# Patient Record
Sex: FEMALE | Race: ASIAN | Employment: UNEMPLOYED | URBAN - METROPOLITAN AREA
[De-identification: names, ages, dates, MRNs, and addresses within clinical notes are randomized per-mention and may not be internally consistent; named-entity substitution may affect disease eponyms.]

---

## 2021-02-22 ENCOUNTER — OFFICE VISIT (OUTPATIENT)
Dept: RHEUMATOLOGY | Age: 31
End: 2021-02-22

## 2021-02-22 VITALS
DIASTOLIC BLOOD PRESSURE: 77 MMHG | HEART RATE: 89 BPM | WEIGHT: 150.4 LBS | SYSTOLIC BLOOD PRESSURE: 110 MMHG | OXYGEN SATURATION: 98 % | HEIGHT: 62 IN | TEMPERATURE: 97.6 F | RESPIRATION RATE: 18 BRPM | BODY MASS INDEX: 27.68 KG/M2

## 2021-02-22 DIAGNOSIS — Z79.899 ONGOING USE OF POSSIBLY TOXIC MEDICATION: ICD-10-CM

## 2021-02-22 DIAGNOSIS — M05.9 SEROPOSITIVE RHEUMATOID ARTHRITIS (HCC): Primary | ICD-10-CM

## 2021-02-22 PROCEDURE — 99205 OFFICE O/P NEW HI 60 MIN: CPT | Performed by: INTERNAL MEDICINE

## 2021-02-22 RX ORDER — MELOXICAM 15 MG/1
TABLET ORAL
COMMUNITY
Start: 2021-01-05 | End: 2021-07-19 | Stop reason: SDUPTHER

## 2021-02-22 RX ORDER — AZATHIOPRINE 100 MG/1
100 TABLET ORAL DAILY
Qty: 30 TAB | Refills: 3 | Status: SHIPPED | OUTPATIENT
Start: 2021-02-22 | End: 2021-02-28

## 2021-02-22 NOTE — PROGRESS NOTES
REASON FOR VISIT:   Carmen Douglas is a 26 y.o. female without significant medical history who is self-referred to [address] [place] [address], regarding a diagnosis of joint pain. HISTORY OF PRESENT ILLNESS  Disease history obtained from the patient and her , who also provided translation after declining the offered phone . Since the birth of her son in December 2018 she has been having hand pain and swelling. Pain also in wrists, around deltoid insertions on arm, and to lesser extent heels in the morning. Established with Rheumatologist in [place], prescribed meloxicam which hasn't been very helpful. Had heartburn with another NSAID before that. Placing hands in hot water helps. Mostly at night and with waking in the morning. No other breathing complaints, rashes. Weight gain over the last 7-8 months, around 10 pounds despite trying an improved diet. Gets a painful dysesthesias around the right ear/parotid area, lasts minutes at a time then regresses. No obvious swelling. Denies subjective dry eyes or mouth. Sleep has been OK. Denies constipation, diarrhea, or postprandial bloating. Had GERD treated with ranitidine in pregnancy. REVIEW OF SYSTEMS  A comprehensive review of systems was negative except for that written in the HPI. A 10-point review of systems is per the new patient questionnaire, which has been reviewed extensively and scanned into the electronic medical record for future reference. Review of systems is as above and is otherwise negative. ALLERGIES  Patient has no known allergies. MEDICATIONS  Current Outpatient Medications   Medication Sig    meloxicam (MOBIC) 15 mg tablet TAKE 1 TABLET BY MOUTH EVERY DAY WITH BREAKFAST     No current facility-administered medications for this visit. PAST MEDICAL HISTORY  History reviewed. No pertinent past medical history. FAMILY HISTORY  family history is not on file. Father had gout.  Mother has asthma. SOCIAL HISTORY  She  reports that she has never smoked. She has never used smokeless tobacco. She reports that she does not drink alcohol or use drugs. Social History     Social History Narrative    Not on file       DATA  Visit Vitals  /77 (BP 1 Location: Left upper arm, BP Patient Position: Sitting)   Pulse 89   Temp 97.6 °F (36.4 °C) (Oral)   Resp 18   Ht 5' 2\" (1.575 m)   Wt 150 lb 6.4 oz (68.2 kg)   LMP 02/20/2021   SpO2 98%   BMI 27.51 kg/m²    Body mass index is 27.51 kg/m². No flowsheet data found. General:  The patient is well developed, well nourished, alert, and in no apparent distress. Eyes: Sclera are anicteric. No conjunctival injection. HEENT:  Oropharynx is clear. No oral ulcers. Adequate salivary pooling. No cervical or supraclavicular lymphadenopathy. Lungs:  Clear to auscultation bilaterally, without wheeze or stridor. Normal respiratory effort. Cor:  Regular rate and rhythm. No murmur rub or gallop. Abdomen: Soft, non-tender, without hepatomegaly or masses. Extremities: No calf tenderness or edema. Warm and well perfused. Skin:  No significant abnormalities. No petechial, purpuric, or psoriaform rashes   Neuro: Nonfocal, no foot or wrist drop. Negative seated SLR bilaterally. Musculoskeletal:    A comprehensive musculoskeletal exam was performed for all joints of each upper and lower extremity and assessed for swelling, tenderness and range of motion. Results are documented as below:  Synovitis of wrists, pan-MCP tenderness, and PIP synovitis and tenderness  No evidence of synovitis in the small joints of the hands, wrists, shoulders, elbows, hips, knees or ankles.      Labs:  7/28/20: Cr 0.76, CBC WNL  3/11/20 CCP 35 (weak+), RF latex 13.7 [<14]; MARIUM direct negative; CRP <1mg/L, ESR 34;       Imaging:  None available      ASSESSMENT AND PLAN  Ms. Mika Correia is a 26 y.o. 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 Supply shortages nationally make sulfasalazine infeasible, and with active polyarthritis Plaquenil (hydroxychloroquine) would be both slow and most likely insufficient. Applying for azathioprine, to which the patient was agreeable after discussion of risks of cytopenias, infections, liver or kidney abnormalities, nausea/vomiting/diarrhea, or rare associations with lymphoma. 1. Seropositive rheumatoid arthritis (HCC)  - C REACTIVE PROTEIN, QT; Standing  - CBC WITH AUTOMATED DIFF; Standing  - METABOLIC PANEL, COMPREHENSIVE; Standing  - SED RATE (ESR); Standing  - THIOPURINE METHYLTRANSFERASE; Future  - CHRONIC HEPATITIS PANEL; Future  - QUANTIFERON-TB GOLD PLUS; Future  - XR HAND RT MIN 3 V; Future  - XR HAND LT MIN 3 V; Future  - XR FOOT RT MIN 3 V; Future  - XR FOOT LT MIN 3 V; Future  - ANTI-NUCLEAR AB BY IFA (RDL); Future  - azaTHIOprine 100 mg tab; Take 100 mg by mouth daily. Dispense: 30 Tab; Refill: 3  - CBC WITH AUTOMATED DIFF; Standing  - METABOLIC PANEL, COMPREHENSIVE; Standing    2. Ongoing use of possibly toxic medication  - C REACTIVE PROTEIN, QT; Standing  - CBC WITH AUTOMATED DIFF; Standing  - METABOLIC PANEL, COMPREHENSIVE; Standing  - SED RATE (ESR); Standing  - THIOPURINE METHYLTRANSFERASE; Future  - CHRONIC HEPATITIS PANEL; Future  - QUANTIFERON-TB GOLD PLUS; Future  - XR HAND RT MIN 3 V; Future  - XR HAND LT MIN 3 V; Future  - XR FOOT RT MIN 3 V; Future  - XR FOOT LT MIN 3 V; Future  - azaTHIOprine 100 mg tab; Take 100 mg by mouth daily. Dispense: 30 Tab; Refill: 3  - CBC WITH AUTOMATED DIFF; Standing  - METABOLIC PANEL, COMPREHENSIVE; Standing    Patient Instructions   1. Labs ASAP, any [facility]    2. Xrays, if you can't get them today then go to any [address] [place] [address]    3. Start azathioprine 100mg daily. Ok to take meloxicam as needed in the meantime. 4. If you get acid reflux, try famotidine 20mg up to twice a day as needed.     5. Repeat labs again in 1 month    6. Return in 6 weeks. Orders Placed This Encounter    QUANTIFERON-TB GOLD PLUS    XR HAND RT MIN 3 V    XR HAND LT MIN 3 V    XR FOOT RT MIN 3 V    XR FOOT LT MIN 3 V    C REACTIVE PROTEIN, QT    CBC WITH AUTOMATED DIFF    METABOLIC PANEL, COMPREHENSIVE    SED RATE (ESR)    THIOPURINE METHYLTRANSFERASE    CHRONIC HEPATITIS PANEL    ANTI-NUCLEAR AB BY IFA (RDL)    CBC WITH AUTOMATED DIFF    METABOLIC PANEL, COMPREHENSIVE    meloxicam (MOBIC) 15 mg tablet    azaTHIOprine 100 mg tab       Medications: I am having Anastasiya Zhang maintain her meloxicam, start azathioprine. Follow up: Return in about 6 weeks (around 4/5/2021).     Face to face time: 45 minutes  Note preparation and records review day of service: 18 minutes  Total provider time day of service: 63 minutes    Robyn Waite MD    Adult Rheumatology   [address], [address] [place] [address]   Phone [phone]  Fax [phone]

## 2021-02-22 NOTE — PATIENT INSTRUCTIONS
1. Labs ASAP, any [facility] 
 
2. Xrays, if you can't get them today then go to any [address] [place] [address] 3. Start azathioprine 100mg daily. Ok to take meloxicam as needed in the meantime. 4. If you get acid reflux, try famotidine 20mg up to twice a day as needed. 5. Repeat labs again in 1 month 6. Return in 6 weeks.

## 2021-02-27 ENCOUNTER — PATIENT MESSAGE (OUTPATIENT)
Dept: RHEUMATOLOGY | Age: 31
End: 2021-02-27

## 2021-02-27 DIAGNOSIS — Z79.899 ONGOING USE OF POSSIBLY TOXIC MEDICATION: ICD-10-CM

## 2021-02-27 DIAGNOSIS — M05.9 SEROPOSITIVE RHEUMATOID ARTHRITIS (HCC): ICD-10-CM

## 2021-02-28 RX ORDER — AZATHIOPRINE 50 MG/1
100 TABLET ORAL DAILY
Qty: 60 TAB | Refills: 5 | Status: SHIPPED | OUTPATIENT
Start: 2021-02-28 | End: 2021-04-05 | Stop reason: SDUPTHER

## 2021-03-01 LAB
ALBUMIN SERPL-MCNC: 4.6 G/DL (ref 3.8–4.8)
ALBUMIN/GLOB SERPL: 1.4 {RATIO} (ref 1.2–2.2)
ALP SERPL-CCNC: 86 IU/L (ref 39–117)
ALT SERPL-CCNC: 27 IU/L (ref 0–32)
ANA HOMOGEN TITR SER: ABNORMAL {TITER}
ANA SER QL IF: POSITIVE
ANA SPECKLED TITR SER: ABNORMAL {TITER}
AST SERPL-CCNC: 24 IU/L (ref 0–40)
BASOPHILS # BLD AUTO: 0 X10E3/UL (ref 0–0.2)
BASOPHILS NFR BLD AUTO: 0 %
BILIRUB SERPL-MCNC: 0.2 MG/DL (ref 0–1.2)
BUN SERPL-MCNC: 10 MG/DL (ref 6–20)
BUN/CREAT SERPL: 16 (ref 9–23)
CALCIUM SERPL-MCNC: 9.8 MG/DL (ref 8.7–10.2)
CHLORIDE SERPL-SCNC: 104 MMOL/L (ref 96–106)
CO2 SERPL-SCNC: 23 MMOL/L (ref 20–29)
COMMENT, 144067: NORMAL
CREAT SERPL-MCNC: 0.62 MG/DL (ref 0.57–1)
CRP SERPL-MCNC: 6 MG/L (ref 0–10)
EOSINOPHIL # BLD AUTO: 0.1 X10E3/UL (ref 0–0.4)
EOSINOPHIL NFR BLD AUTO: 1 %
ERYTHROCYTE [DISTWIDTH] IN BLOOD BY AUTOMATED COUNT: 12.6 % (ref 11.7–15.4)
ERYTHROCYTE [SEDIMENTATION RATE] IN BLOOD BY WESTERGREN METHOD: 62 MM/HR (ref 0–32)
GAMMA INTERFERON BACKGROUND BLD IA-ACNC: 0.07 IU/ML
GLOBULIN SER CALC-MCNC: 3.4 G/DL (ref 1.5–4.5)
GLUCOSE SERPL-MCNC: 90 MG/DL (ref 65–99)
HBV CORE AB SERPL QL IA: NEGATIVE
HBV CORE IGM SERPL QL IA: NEGATIVE
HBV E AB SERPL QL IA: NORMAL
HBV E AG SERPL QL IA: NEGATIVE
HBV SURFACE AB SER QL: NON REACTIVE
HBV SURFACE AG SERPL QL IA: NEGATIVE
HCT VFR BLD AUTO: 39.8 % (ref 34–46.6)
HCV AB S/CO SERPL IA: <0.1 S/CO RATIO (ref 0–0.9)
HGB BLD-MCNC: 12.8 G/DL (ref 11.1–15.9)
IMM GRANULOCYTES # BLD AUTO: 0 X10E3/UL (ref 0–0.1)
IMM GRANULOCYTES NFR BLD AUTO: 0 %
INTERPRETATION:, 510752: NORMAL
LYMPHOCYTES # BLD AUTO: 1.9 X10E3/UL (ref 0.7–3.1)
LYMPHOCYTES NFR BLD AUTO: 19 %
M TB IFN-G BLD-IMP: NEGATIVE
M TB IFN-G CD4+ BCKGRND COR BLD-ACNC: 0.05 IU/ML
MCH RBC QN AUTO: 27.9 PG (ref 26.6–33)
MCHC RBC AUTO-ENTMCNC: 32.2 G/DL (ref 31.5–35.7)
MCV RBC AUTO: 87 FL (ref 79–97)
MITOGEN IGNF BLD-ACNC: >10 IU/ML
MONOCYTES # BLD AUTO: 0.4 X10E3/UL (ref 0.1–0.9)
MONOCYTES NFR BLD AUTO: 4 %
NEUTROPHILS # BLD AUTO: 7.5 X10E3/UL (ref 1.4–7)
NEUTROPHILS NFR BLD AUTO: 76 %
NOTE, 018286: ABNORMAL
PLATELET # BLD AUTO: 172 X10E3/UL (ref 150–450)
POTASSIUM SERPL-SCNC: 4.4 MMOL/L (ref 3.5–5.2)
PROT SERPL-MCNC: 8 G/DL (ref 6–8.5)
QUANTIFERON INCUBATION, QF1T: NORMAL
QUANTIFERON TB2 AG: 0.06 IU/ML
RBC # BLD AUTO: 4.59 X10E6/UL (ref 3.77–5.28)
REF LAB TEST METHOD: NORMAL
SERVICE CMNT-IMP: NORMAL
SODIUM SERPL-SCNC: 143 MMOL/L (ref 134–144)
TPMT RBC-CCNC: 20.1 UNITS/ML RBC
WBC # BLD AUTO: 10.1 X10E3/UL (ref 3.4–10.8)

## 2021-03-09 NOTE — TELEPHONE ENCOUNTER
Young Reilly RN 3/8/2021 8:56 AM EST      ----- Message -----  From: Melvin Garcia  Sent: 3/6/2021 11:54 AM EST  To: [facility] Nurse Pool  Subject: RE: Prescription Question     Dear Dr. Jihan Willis,    I was just curious about the medication, as you requested to contact if we do not hear from you by the end of this week. Could you please help me on this and expedite the process if possible. Thanks again.

## 2021-04-03 LAB
ALBUMIN SERPL-MCNC: 4.9 G/DL (ref 3.8–4.8)
ALBUMIN/GLOB SERPL: 1.8 {RATIO} (ref 1.2–2.2)
ALP SERPL-CCNC: 79 IU/L (ref 39–117)
ALT SERPL-CCNC: 14 IU/L (ref 0–32)
AST SERPL-CCNC: 21 IU/L (ref 0–40)
BASOPHILS # BLD AUTO: 0 X10E3/UL (ref 0–0.2)
BASOPHILS NFR BLD AUTO: 0 %
BILIRUB SERPL-MCNC: 0.4 MG/DL (ref 0–1.2)
BUN SERPL-MCNC: 10 MG/DL (ref 6–20)
BUN/CREAT SERPL: 15 (ref 9–23)
CALCIUM SERPL-MCNC: 9.9 MG/DL (ref 8.7–10.2)
CHLORIDE SERPL-SCNC: 101 MMOL/L (ref 96–106)
CO2 SERPL-SCNC: 26 MMOL/L (ref 20–29)
CREAT SERPL-MCNC: 0.68 MG/DL (ref 0.57–1)
EOSINOPHIL # BLD AUTO: 0.2 X10E3/UL (ref 0–0.4)
EOSINOPHIL NFR BLD AUTO: 2 %
ERYTHROCYTE [DISTWIDTH] IN BLOOD BY AUTOMATED COUNT: 13.2 % (ref 11.7–15.4)
GLOBULIN SER CALC-MCNC: 2.7 G/DL (ref 1.5–4.5)
GLUCOSE SERPL-MCNC: 117 MG/DL (ref 65–99)
HCT VFR BLD AUTO: 37.4 % (ref 34–46.6)
HGB BLD-MCNC: 11.7 G/DL (ref 11.1–15.9)
IMM GRANULOCYTES # BLD AUTO: 0 X10E3/UL (ref 0–0.1)
IMM GRANULOCYTES NFR BLD AUTO: 0 %
LYMPHOCYTES # BLD AUTO: 2.3 X10E3/UL (ref 0.7–3.1)
LYMPHOCYTES NFR BLD AUTO: 25 %
MCH RBC QN AUTO: 27.9 PG (ref 26.6–33)
MCHC RBC AUTO-ENTMCNC: 31.3 G/DL (ref 31.5–35.7)
MCV RBC AUTO: 89 FL (ref 79–97)
MONOCYTES # BLD AUTO: 0.4 X10E3/UL (ref 0.1–0.9)
MONOCYTES NFR BLD AUTO: 5 %
NEUTROPHILS # BLD AUTO: 6.1 X10E3/UL (ref 1.4–7)
NEUTROPHILS NFR BLD AUTO: 68 %
PLATELET # BLD AUTO: 166 X10E3/UL (ref 150–450)
POTASSIUM SERPL-SCNC: 4 MMOL/L (ref 3.5–5.2)
PROT SERPL-MCNC: 7.6 G/DL (ref 6–8.5)
RBC # BLD AUTO: 4.2 X10E6/UL (ref 3.77–5.28)
SODIUM SERPL-SCNC: 141 MMOL/L (ref 134–144)
WBC # BLD AUTO: 9.1 X10E3/UL (ref 3.4–10.8)

## 2021-04-05 ENCOUNTER — OFFICE VISIT (OUTPATIENT)
Dept: RHEUMATOLOGY | Age: 31
End: 2021-04-05
Payer: COMMERCIAL

## 2021-04-05 VITALS
OXYGEN SATURATION: 97 % | DIASTOLIC BLOOD PRESSURE: 76 MMHG | WEIGHT: 150.4 LBS | RESPIRATION RATE: 16 BRPM | BODY MASS INDEX: 27.68 KG/M2 | HEART RATE: 95 BPM | SYSTOLIC BLOOD PRESSURE: 108 MMHG | HEIGHT: 62 IN | TEMPERATURE: 97.7 F

## 2021-04-05 DIAGNOSIS — M05.9 SEROPOSITIVE RHEUMATOID ARTHRITIS (HCC): ICD-10-CM

## 2021-04-05 DIAGNOSIS — Z79.899 ONGOING USE OF POSSIBLY TOXIC MEDICATION: ICD-10-CM

## 2021-04-05 DIAGNOSIS — R76.8 ANA POSITIVE: Primary | ICD-10-CM

## 2021-04-05 PROCEDURE — 99215 OFFICE O/P EST HI 40 MIN: CPT | Performed by: INTERNAL MEDICINE

## 2021-04-05 RX ORDER — PREDNISONE 5 MG/1
TABLET ORAL
Qty: 60 TAB | Refills: 2 | Status: SHIPPED | OUTPATIENT
Start: 2021-04-05 | End: 2021-07-06

## 2021-04-05 RX ORDER — AZATHIOPRINE 50 MG/1
125 TABLET ORAL DAILY
Qty: 75 TAB | Refills: 3 | Status: SHIPPED | OUTPATIENT
Start: 2021-04-05 | End: 2021-08-10

## 2021-04-05 NOTE — PATIENT INSTRUCTIONS
1. Start prednisone 2 pills (10mg) daily with breakfast for 2-4 weeks, then when feeling good decrease to 1 tab (5mg daily) 2. Increase azathioprine to 2.5 tabs (125mg) oral daily with breakfast. 
 
3. Labs again in 2-4 weeks. 4. If the burning pains worsen, let me know and we can try gabapentin for comfort. 5. Look for Voltaren gel (diclofenac 1%) from [facility] or your local pharmacy to use on the knuckles as needed. 6. Return in 6 weeks.

## 2021-04-05 NOTE — PROGRESS NOTES
Chief Complaint   Patient presents with    Arthritis     hands and upper arm     Visit Vitals  /76 (BP 1 Location: Left arm, BP Patient Position: Sitting, BP Cuff Size: Small adult)   Pulse 95   Temp 97.7 °F (36.5 °C) (Oral)   Resp 16   Ht 5' 2\" (1.575 m)   Wt 150 lb 6.4 oz (68.2 kg)   SpO2 97%   BMI 27.51 kg/m²     1. Have you been to the ER, urgent care clinic since your last visit? Hospitalized since your last visit?no    2. Have you seen or consulted any other health care providers outside of the [address] [place] [address] since your last visit? Include any pap smears or colon screening.  no

## 2021-04-05 NOTE — PROGRESS NOTES
REASON FOR VISIT:   Raquel Coley is a 26 y.o. female with history of CCP+, MARIUM+ nonerosive rheumatoid arthritis     HISTORY OF PRESENT ILLNESS  History obtained from patient and  who provided translation after declining . Pain in bilateral 1st MCPs now, doesn't notice major improvement. Tolerating the azathioprine 100mg daily, was slow to start due to high copay initially when we tried to order the generic. No GI upset. Had stopped the meloxicam when she started the azathioprine. Dysesthesias behind ears, not persistent      REVIEW OF SYSTEMS  A comprehensive review of systems was negative except for that written in the HPI. A 10-point review of systems is per the new patient questionnaire, which has been reviewed extensively and scanned into the electronic medical record for future reference. Review of systems is as above and is otherwise negative. ALLERGIES  Patient has no known allergies. MEDICATIONS  Current Outpatient Medications   Medication Sig    azaTHIOprine (IMURAN) 50 mg tablet Take 2 Tabs by mouth daily.  meloxicam (MOBIC) 15 mg tablet TAKE 1 TABLET BY MOUTH EVERY DAY WITH BREAKFAST     No current facility-administered medications for this visit. PAST MEDICAL HISTORY  History reviewed. No pertinent past medical history. FAMILY HISTORY  family history is not on file. Father had gout. Mother has asthma. SOCIAL HISTORY  She  reports that she has never smoked. She has never used smokeless tobacco. She reports that she does not drink alcohol or use drugs. Social History     Social History Narrative    Not on file       DATA  Visit Vitals  /76 (BP 1 Location: Left arm, BP Patient Position: Sitting, BP Cuff Size: Small adult)   Pulse 95   Temp 97.7 °F (36.5 °C) (Oral)   Resp 16   Ht 5' 2\" (1.575 m)   Wt 150 lb 6.4 oz (68.2 kg)   SpO2 97%   BMI 27.51 kg/m²    Body mass index is 27.51 kg/m². No flowsheet data found.     General:  The patient is well developed, well nourished, alert, and in no apparent distress. Eyes: Sclera are anicteric. No conjunctival injection. HEENT:  Oropharynx is clear. No oral ulcers. Adequate salivary pooling. No cervical or supraclavicular lymphadenopathy. Lungs:  Clear to auscultation bilaterally, without wheeze or stridor. Normal respiratory effort. Cor:  Regular rate and rhythm. No murmur rub or gallop. Abdomen: Soft, non-tender, without hepatomegaly or masses. Extremities: No calf tenderness or edema. Warm and well perfused. Skin:  No significant abnormalities. No petechial, purpuric, or psoriaform rashes   Neuro: Nonfocal, no foot or wrist drop. Negative seated SLR bilaterally. Musculoskeletal:    A comprehensive musculoskeletal exam was performed for all joints of each upper and lower extremity and assessed for swelling, tenderness and range of motion. Results are documented as below:  Bilateral 1st MCP synovitis, PIP3 synovitis in right hand  Interval resolved synovitis of wrists. Right 5th MTP tendnerness  No evidence of synovitis in the shoulders, elbows, hips, knees or ankles. Labs:  21: CBC WNL; CMP WNL  21: TPMT WNL; MARIUM 1:320 homogenous; CRP 8mg/L; Hep B cAb neg, sAb neg; sAg neg; Hep C Ab neg  20: Cr 0.76, CBC WNL  3/11/20 CCP 35 (weak+), RF latex 13.7 [<14]; MARIUM direct negative; CRP <1mg/L, ESR 34;       Imagin21: XR hands and feet: No erosions, no chondrocalcinosis, preserved joint spacess      ASSESSMENT AND PLAN  Ms. Dean Sanchez is a 26 y.o. female who presents for evaluation of CCP+, MARIUM+ nonerosive rheumatoid arthritis with early partial response to azathioprine. Bridging with prednisone, increasing to 125mg (~2mg/kg). 1. Seropositive rheumatoid arthritis (HCC)  - predniSONE (DELTASONE) 5 mg tablet; Take 2 tabs (10mg) PO daily with breakfast for 2 weeks, then if feeling well decrease to 1 tab daily until next visit. Dispense: 60 Tab;  Refill: 2  - azaTHIOprine (IMURAN) 50 mg tablet; Take 2.5 Tabs by mouth daily. Dispense: 75 Tab; Refill: 3  - C REACTIVE PROTEIN, QT; Future  - CBC WITH AUTOMATED DIFF; Future  - METABOLIC PANEL, COMPREHENSIVE; Future  - SED RATE (ESR); Future  - COMPLEMENT, C3 & C4; Future  - DSDNA ANTIBODY BY IFA, RJ LUCILIAE, WITH REFLEX TO TITER; Future  - SMITH ANTIBODIES; Future    2. Ongoing use of possibly toxic medication  - azaTHIOprine (IMURAN) 50 mg tablet; Take 2.5 Tabs by mouth daily. Dispense: 75 Tab; Refill: 3  - CBC WITH AUTOMATED DIFF; Future  - METABOLIC PANEL, COMPREHENSIVE; Future    3. MARIUM positive  - predniSONE (DELTASONE) 5 mg tablet; Take 2 tabs (10mg) PO daily with breakfast for 2 weeks, then if feeling well decrease to 1 tab daily until next visit. Dispense: 60 Tab; Refill: 2  - azaTHIOprine (IMURAN) 50 mg tablet; Take 2.5 Tabs by mouth daily. Dispense: 75 Tab; Refill: 3  - C REACTIVE PROTEIN, QT; Future  - CBC WITH AUTOMATED DIFF; Future  - METABOLIC PANEL, COMPREHENSIVE; Future  - SED RATE (ESR); Future  - COMPLEMENT, C3 & C4; Future  - DSDNA ANTIBODY BY IFA, RJ LUCILIAE, WITH REFLEX TO TITER; Future  - SMITH ANTIBODIES; Future      Patient Instructions   1. Start prednisone 2 pills (10mg) daily with breakfast for 2-4 weeks, then when feeling good decrease to 1 tab (5mg daily)    2. Increase azathioprine to 2.5 tabs (125mg) oral daily with breakfast.    3. Labs again in 2-4 weeks. 4. If the burning pains worsen, let me know and we can try gabapentin for comfort. 5. Look for Voltaren gel (diclofenac 1%) from [facility] or your local pharmacy to use on the knuckles as needed. 6. Return in 6 weeks.       Orders Placed This Encounter    C REACTIVE PROTEIN, QT    CBC WITH AUTOMATED DIFF    METABOLIC PANEL, COMPREHENSIVE    SED RATE (ESR)    COMPLEMENT, C3 & C4    DSDNA ANTIBODY BY IFA, RENEEDILENIN LUCILIAE, WITH REFLEX TO TITER    SMITH ANTIBODIES    predniSONE (DELTASONE) 5 mg tablet    azaTHIOprine (IMURAN) 50 mg tablet       Follow up: Return in about 6 weeks (around 5/17/2021).     Face to face time: 30 minutes  Note preparation and records review day of service: 15 minutes  Total provider time day of service: 45 minutes    Marcelino Mejía MD    Adult Rheumatology   [address]  [facility], [facility]   Phone [phone]  Fax [phone]

## 2021-05-05 LAB
ALBUMIN SERPL-MCNC: 4.5 G/DL (ref 3.8–4.8)
ALBUMIN/GLOB SERPL: 1.8 {RATIO} (ref 1.2–2.2)
ALP SERPL-CCNC: 81 IU/L (ref 39–117)
ALT SERPL-CCNC: 28 IU/L (ref 0–32)
AST SERPL-CCNC: 24 IU/L (ref 0–40)
BASOPHILS # BLD AUTO: 0 X10E3/UL (ref 0–0.2)
BASOPHILS NFR BLD AUTO: 0 %
BILIRUB SERPL-MCNC: 0.3 MG/DL (ref 0–1.2)
BUN SERPL-MCNC: 10 MG/DL (ref 6–20)
BUN/CREAT SERPL: 17 (ref 9–23)
C3 SERPL-MCNC: 126 MG/DL (ref 82–167)
C4 SERPL-MCNC: 37 MG/DL (ref 12–38)
CALCIUM SERPL-MCNC: 9 MG/DL (ref 8.7–10.2)
CHLORIDE SERPL-SCNC: 104 MMOL/L (ref 96–106)
CO2 SERPL-SCNC: 28 MMOL/L (ref 20–29)
CREAT SERPL-MCNC: 0.6 MG/DL (ref 0.57–1)
CRP SERPL-MCNC: 2 MG/L (ref 0–10)
DSDNA AB SER QL CLIF: NEGATIVE
ENA SM AB SER-ACNC: <0.2 AI (ref 0–0.9)
EOSINOPHIL # BLD AUTO: 0.1 X10E3/UL (ref 0–0.4)
EOSINOPHIL NFR BLD AUTO: 1 %
ERYTHROCYTE [DISTWIDTH] IN BLOOD BY AUTOMATED COUNT: 14.6 % (ref 11.7–15.4)
ERYTHROCYTE [SEDIMENTATION RATE] IN BLOOD BY WESTERGREN METHOD: 29 MM/HR (ref 0–32)
GLOBULIN SER CALC-MCNC: 2.5 G/DL (ref 1.5–4.5)
GLUCOSE SERPL-MCNC: 105 MG/DL (ref 65–99)
HCT VFR BLD AUTO: 37.9 % (ref 34–46.6)
HGB BLD-MCNC: 11.9 G/DL (ref 11.1–15.9)
IMM GRANULOCYTES # BLD AUTO: 0 X10E3/UL (ref 0–0.1)
IMM GRANULOCYTES NFR BLD AUTO: 0 %
LYMPHOCYTES # BLD AUTO: 2.3 X10E3/UL (ref 0.7–3.1)
LYMPHOCYTES NFR BLD AUTO: 26 %
MCH RBC QN AUTO: 28.1 PG (ref 26.6–33)
MCHC RBC AUTO-ENTMCNC: 31.4 G/DL (ref 31.5–35.7)
MCV RBC AUTO: 89 FL (ref 79–97)
MONOCYTES # BLD AUTO: 0.5 X10E3/UL (ref 0.1–0.9)
MONOCYTES NFR BLD AUTO: 6 %
MORPHOLOGY BLD-IMP: ABNORMAL
NEUTROPHILS # BLD AUTO: 6.1 X10E3/UL (ref 1.4–7)
NEUTROPHILS NFR BLD AUTO: 67 %
PLATELET # BLD AUTO: 110 X10E3/UL (ref 150–450)
POTASSIUM SERPL-SCNC: 4.1 MMOL/L (ref 3.5–5.2)
PROT SERPL-MCNC: 7 G/DL (ref 6–8.5)
RBC # BLD AUTO: 4.24 X10E6/UL (ref 3.77–5.28)
SODIUM SERPL-SCNC: 143 MMOL/L (ref 134–144)
WBC # BLD AUTO: 9.1 X10E3/UL (ref 3.4–10.8)

## 2021-05-17 ENCOUNTER — OFFICE VISIT (OUTPATIENT)
Dept: RHEUMATOLOGY | Age: 31
End: 2021-05-17
Payer: COMMERCIAL

## 2021-05-17 VITALS
BODY MASS INDEX: 27.97 KG/M2 | DIASTOLIC BLOOD PRESSURE: 72 MMHG | TEMPERATURE: 98.3 F | SYSTOLIC BLOOD PRESSURE: 101 MMHG | HEART RATE: 86 BPM | RESPIRATION RATE: 18 BRPM | WEIGHT: 152 LBS | HEIGHT: 62 IN | OXYGEN SATURATION: 98 %

## 2021-05-17 DIAGNOSIS — Z79.899 ONGOING USE OF POSSIBLY TOXIC MEDICATION: ICD-10-CM

## 2021-05-17 DIAGNOSIS — M05.9 SEROPOSITIVE RHEUMATOID ARTHRITIS (HCC): Primary | ICD-10-CM

## 2021-05-17 DIAGNOSIS — R76.8 ANA POSITIVE: ICD-10-CM

## 2021-05-17 PROCEDURE — 99215 OFFICE O/P EST HI 40 MIN: CPT | Performed by: INTERNAL MEDICINE

## 2021-05-17 NOTE — PROGRESS NOTES
REASON FOR VISIT:   Ondina Cummings is a 26 y.o. female with history of CCP+, MARIUM+ nonerosive rheumatoid arthritis returning for in-office followup    HISTORY OF PRESENT ILLNESS  History obtained today from patient and  who provided translation after declining . Had increased azathioprine to 125mg but felt better so reduced back to 100mg. Prednisone also had increased to 10mg daily for about 2 weeks, then decreased to 5mg daily which she now continues. Little pain now in MCPs, far better than before. No nausea or stomach upset. No interval infections. No rashes, no breathing complaints. REVIEW OF SYSTEMS  A comprehensive review of systems was negative except for that written in the HPI. A 10-point review of systems is per the new patient questionnaire, which has been reviewed extensively and scanned into the electronic medical record for future reference. Review of systems is as above and is otherwise negative. ALLERGIES  Patient has no known allergies. MEDICATIONS  Current Outpatient Medications   Medication Sig    predniSONE (DELTASONE) 5 mg tablet Take 2 tabs (10mg) PO daily with breakfast for 2 weeks, then if feeling well decrease to 1 tab daily until next visit.  azaTHIOprine (IMURAN) 50 mg tablet Take 2.5 Tabs by mouth daily.  meloxicam (MOBIC) 15 mg tablet TAKE 1 TABLET BY MOUTH EVERY DAY WITH BREAKFAST     No current facility-administered medications for this visit. PAST MEDICAL HISTORY  History reviewed. No pertinent past medical history. FAMILY HISTORY  family history is not on file. Father had gout. Mother has asthma. SOCIAL HISTORY  She  reports that she has never smoked. She has never used smokeless tobacco. She reports that she does not drink alcohol or use drugs.   Social History     Social History Narrative    Not on file       DATA  Visit Vitals  /72 (BP 1 Location: Left upper arm, BP Patient Position: Sitting)   Pulse 86   Temp 98.3 °F (36.8 °C) (Oral)   Resp 18   Ht 5' 2\" (1.575 m)   Wt 152 lb (68.9 kg)   LMP 2021   SpO2 98%   BMI 27.80 kg/m²    Body mass index is 27.8 kg/m². No flowsheet data found. mHAQ 0.0  Patient pain: 30  Patient global:   MD global: 2/10    General:  The patient is well developed, well nourished, alert, and in no apparent distress. Eyes: Sclera are anicteric. No conjunctival injection. HEENT:  Oropharynx is clear. No oral ulcers. Adequate salivary pooling. No cervical or supraclavicular lymphadenopathy. Lungs:  Clear to auscultation bilaterally, without wheeze or stridor. Normal respiratory effort. Cor:  Regular rate and rhythm. No murmur rub or gallop. Abdomen: Soft, non-tender, without hepatomegaly or masses. Extremities: No calf tenderness or edema. Warm and well perfused. Skin:  No significant abnormalities. No petechial, purpuric, or psoriaform rashes   Neuro: Nonfocal, no foot or wrist drop. Negative seated SLR bilaterally. Musculoskeletal:    A comprehensive musculoskeletal exam was performed for all joints of each upper and lower extremity and assessed for swelling, tenderness and range of motion. Results are documented as below:  Trace right 1st MCP synovitis, interval resolved PIP3 synovitis and tenderness in right hand  Interval resolved synovitis of wrists. Right 5th MTP tendnerness  No evidence of synovitis in the shoulders, elbows, hips, knees or ankles. Labs:  21: CBC WNL (WBC 9.1), CMP WNL, ESR 29  21: CBC WNL; CMP WNL  21: TPMT WNL; MARIUM 1:320 homogenous; CRP 8mg/L; Hep B cAb neg, sAb neg; sAg neg;  Hep C Ab neg, ESR 62  20: Cr 0.76, CBC WNL  3/11/20 CCP 35 (weak+), RF latex 13.7 [<14]; MARIUM direct negative; CRP <1mg/L, ESR 34;       Imagin21: XR hands and feet: No erosions, no chondrocalcinosis, preserved joint spacess      ASSESSMENT AND PLAN  Ms. Mary Cassidy is a 26 y.o. female who presents for evaluation of CCP+, MARIUM+ nonerosive rheumatoid arthritis now with low disease activity by [facility] on azathioprine and low-dose prednisone. Completing wean off of prednisone, returning to 125mg daily azathioprine. 1. Seropositive rheumatoid arthritis ([address]  - Resume 2.5 azathioprine pills (125mg) daily, roughly 2mg/kg  - C REACTIVE PROTEIN, QT; Future  - CBC WITH AUTOMATED DIFF; Future  - METABOLIC PANEL, COMPREHENSIVE; Future  - SED RATE (ESR); Future  - URINALYSIS W/MICROSCOPIC; Future    2. MARIUM positive  - URINALYSIS W/MICROSCOPIC; Future    3. Ongoing use of possibly toxic medication  - CBC WITH AUTOMATED DIFF; Future  - METABOLIC PANEL, COMPREHENSIVE; Future    Patient Instructions   1. Decrease prednisone to 5mg (1 pill) every other day, or 2.5mg (1/2 pill) every day. After 4 weeks, if you're feeling good, then you can go ahead and stop. 2. Increase your azathioprine back to 2.5 pills every day. 3. Repeat labs in 6 weeks    4. Return in 8 weeks, virtual visit is fine for this visit. Orders Placed This Encounter    C REACTIVE PROTEIN, QT    CBC WITH AUTOMATED DIFF    METABOLIC PANEL, COMPREHENSIVE    SED RATE (ESR)    URINALYSIS W/MICROSCOPIC       Follow up: Return in about 8 weeks (around 7/12/2021).     Face to face time: 25 minutes  Note preparation and records review day of service: 20 minutes  Total provider time day of service: 45 minutes    Lennox Peat, MD    Adult Rheumatology   [address], [address] [facility], [address]   Phone [phone]  Fax [phone]

## 2021-05-17 NOTE — PROGRESS NOTES
Chief Complaint   Patient presents with    Arthritis     hand     1. Have you been to the ER, urgent care clinic since your last visit? Hospitalized since your last visit? No    2. Have you seen or consulted any other health care providers outside of the [address] [place] [address] since your last visit? Include any pap smears or colon screening.  No

## 2021-05-17 NOTE — PATIENT INSTRUCTIONS
1. Decrease prednisone to 5mg (1 pill) every other day, or 2.5mg (1/2 pill) every day. After 4 weeks, if you're feeling good, then you can go ahead and stop. 2. Increase your azathioprine back to 2.5 pills every day. 3. Repeat labs in 6 weeks 4. Return in 8 weeks, virtual visit is fine for this visit.

## 2021-06-26 LAB
ALBUMIN SERPL-MCNC: 4.5 G/DL (ref 3.8–4.8)
ALBUMIN/GLOB SERPL: 1.9 {RATIO} (ref 1.2–2.2)
ALP SERPL-CCNC: 72 IU/L (ref 48–121)
ALT SERPL-CCNC: 17 IU/L (ref 0–32)
APPEARANCE UR: CLEAR
AST SERPL-CCNC: 20 IU/L (ref 0–40)
BACTERIA #/AREA URNS HPF: NORMAL /[HPF]
BASOPHILS # BLD AUTO: 0.1 X10E3/UL (ref 0–0.2)
BASOPHILS NFR BLD AUTO: 1 %
BILIRUB SERPL-MCNC: 0.3 MG/DL (ref 0–1.2)
BILIRUB UR QL STRIP: NEGATIVE
BUN SERPL-MCNC: 11 MG/DL (ref 6–20)
BUN/CREAT SERPL: 18 (ref 9–23)
CALCIUM SERPL-MCNC: 9.4 MG/DL (ref 8.7–10.2)
CASTS URNS QL MICRO: NORMAL /LPF
CHLORIDE SERPL-SCNC: 105 MMOL/L (ref 96–106)
CO2 SERPL-SCNC: 23 MMOL/L (ref 20–29)
COLOR UR: YELLOW
CREAT SERPL-MCNC: 0.62 MG/DL (ref 0.57–1)
CRP SERPL-MCNC: 3 MG/L (ref 0–10)
EOSINOPHIL # BLD AUTO: 0.1 X10E3/UL (ref 0–0.4)
EOSINOPHIL NFR BLD AUTO: 1 %
EPI CELLS #/AREA URNS HPF: NORMAL /HPF (ref 0–10)
ERYTHROCYTE [DISTWIDTH] IN BLOOD BY AUTOMATED COUNT: 14 % (ref 11.7–15.4)
ERYTHROCYTE [SEDIMENTATION RATE] IN BLOOD BY WESTERGREN METHOD: 43 MM/HR (ref 0–32)
GLOBULIN SER CALC-MCNC: 2.4 G/DL (ref 1.5–4.5)
GLUCOSE SERPL-MCNC: 103 MG/DL (ref 65–99)
GLUCOSE UR QL: NEGATIVE
HCT VFR BLD AUTO: 35.2 % (ref 34–46.6)
HGB BLD-MCNC: 12 G/DL (ref 11.1–15.9)
HGB UR QL STRIP: NEGATIVE
IMM GRANULOCYTES # BLD AUTO: 0 X10E3/UL (ref 0–0.1)
IMM GRANULOCYTES NFR BLD AUTO: 0 %
KETONES UR QL STRIP: NEGATIVE
LEUKOCYTE ESTERASE UR QL STRIP: NEGATIVE
LYMPHOCYTES # BLD AUTO: 2.2 X10E3/UL (ref 0.7–3.1)
LYMPHOCYTES NFR BLD AUTO: 25 %
MCH RBC QN AUTO: 29.8 PG (ref 26.6–33)
MCHC RBC AUTO-ENTMCNC: 34.1 G/DL (ref 31.5–35.7)
MCV RBC AUTO: 87 FL (ref 79–97)
MICRO URNS: NORMAL
MICRO URNS: NORMAL
MONOCYTES # BLD AUTO: 0.6 X10E3/UL (ref 0.1–0.9)
MONOCYTES NFR BLD AUTO: 7 %
NEUTROPHILS # BLD AUTO: 6 X10E3/UL (ref 1.4–7)
NEUTROPHILS NFR BLD AUTO: 66 %
NITRITE UR QL STRIP: NEGATIVE
PH UR STRIP: 7 [PH] (ref 5–7.5)
PLATELET # BLD AUTO: 140 X10E3/UL (ref 150–450)
POTASSIUM SERPL-SCNC: 4 MMOL/L (ref 3.5–5.2)
PROT SERPL-MCNC: 6.9 G/DL (ref 6–8.5)
PROT UR QL STRIP: NEGATIVE
RBC # BLD AUTO: 4.03 X10E6/UL (ref 3.77–5.28)
RBC #/AREA URNS HPF: NORMAL /HPF (ref 0–2)
SODIUM SERPL-SCNC: 143 MMOL/L (ref 134–144)
SP GR UR: 1.02 (ref 1–1.03)
UROBILINOGEN UR STRIP-MCNC: 0.2 MG/DL (ref 0.2–1)
WBC # BLD AUTO: 8.9 X10E3/UL (ref 3.4–10.8)
WBC #/AREA URNS HPF: NORMAL /HPF (ref 0–5)

## 2021-07-06 DIAGNOSIS — R76.8 ANA POSITIVE: ICD-10-CM

## 2021-07-06 DIAGNOSIS — M05.9 SEROPOSITIVE RHEUMATOID ARTHRITIS (HCC): ICD-10-CM

## 2021-07-06 RX ORDER — PREDNISONE 5 MG/1
TABLET ORAL
Qty: 60 TABLET | Refills: 2 | Status: SHIPPED | OUTPATIENT
Start: 2021-07-06

## 2021-07-12 ENCOUNTER — PATIENT MESSAGE (OUTPATIENT)
Dept: RHEUMATOLOGY | Age: 31
End: 2021-07-12

## 2021-07-12 DIAGNOSIS — M67.88 ACHILLES TENDINOSIS: Primary | ICD-10-CM

## 2021-07-12 DIAGNOSIS — M05.9 SEROPOSITIVE RHEUMATOID ARTHRITIS (HCC): ICD-10-CM

## 2021-07-19 ENCOUNTER — VIRTUAL VISIT (OUTPATIENT)
Dept: RHEUMATOLOGY | Age: 31
End: 2021-07-19
Payer: COMMERCIAL

## 2021-07-19 DIAGNOSIS — Z79.899 ONGOING USE OF POSSIBLY TOXIC MEDICATION: ICD-10-CM

## 2021-07-19 DIAGNOSIS — M05.9 SEROPOSITIVE RHEUMATOID ARTHRITIS (HCC): Primary | ICD-10-CM

## 2021-07-19 PROCEDURE — 99215 OFFICE O/P EST HI 40 MIN: CPT | Performed by: INTERNAL MEDICINE

## 2021-07-19 RX ORDER — MELOXICAM 15 MG/1
15 TABLET ORAL DAILY
Qty: 30 TABLET | Refills: 2 | Status: SHIPPED | OUTPATIENT
Start: 2021-07-19 | End: 2021-09-24 | Stop reason: SDUPTHER

## 2021-07-19 NOTE — PATIENT INSTRUCTIONS
1. Continue azathioprine 125mg (2.5 pills) daily    2. Start meloxicam once daily. To help most with morning stiffness, try taking it with dinner or at bedtime. 3. Repeat labs in 6 weeks    4. Return in 8 weeks, we'll call you to schedule.

## 2021-07-19 NOTE — PROGRESS NOTES
REASON FOR VISIT:   Liana Hopson is a 26 y.o. female with history of CCP+, MARIUM+ nonerosive rheumatoid arthritis returning for virtual telehealth followup    HISTORY OF PRESENT ILLNESS  History obtained through  who provided translation    Pains persist on the outside of the right foot  and the MCP of the right thumb    Voltaren gel has been helping. No diarrhea or nausea with azathioprine, taking 125mg daily. Questions about PT copays, hasn't found dedicated PT yet. No interval infections      REVIEW OF SYSTEMS  A comprehensive review of systems was negative except for that written in the HPI. A 10-point review of systems is per the new patient questionnaire, which has been reviewed extensively and scanned into the electronic medical record for future reference. Review of systems is as above and is otherwise negative. ALLERGIES  Patient has no known allergies. MEDICATIONS  Current Outpatient Medications   Medication Sig    predniSONE (DELTASONE) 5 mg tablet TAKE 2TABS DAILY W/BREAKFAST FOR 2 WKS, THEN IF FEELING WELL DECREASE TO 1TAB DAILY UNTIL NEXT VISIT.  azaTHIOprine (IMURAN) 50 mg tablet Take 2.5 Tabs by mouth daily.  meloxicam (MOBIC) 15 mg tablet TAKE 1 TABLET BY MOUTH EVERY DAY WITH BREAKFAST     No current facility-administered medications for this visit. PAST MEDICAL HISTORY  No past medical history on file. FAMILY HISTORY  family history is not on file. Father had gout. Mother has asthma. SOCIAL HISTORY  She  reports that she has never smoked. She has never used smokeless tobacco. She reports that she does not drink alcohol and does not use drugs. Social History     Social History Narrative    Not on file       DATA  No vitals for virtual visit    MD global: 3/10    General:  The patient is well developed, well nourished, alert, and in no apparent distress. Eyes: Sclera are anicteric. No conjunctival injection. HEENT:  Oropharynx is clear. No oral ulcers. Adequate salivary pooling. No cervical or supraclavicular lymphadenopathy. Lungs:  Clear to auscultation bilaterally, without wheeze or stridor. Normal respiratory effort. Cor:  Regular rate and rhythm. No murmur rub or gallop. Abdomen: Soft, non-tender, without hepatomegaly or masses. Extremities: No calf tenderness or edema. Warm and well perfused. Skin:  No significant abnormalities. No petechial, purpuric, or psoriaform rashes   Neuro: Nonfocal, no foot or wrist drop. Negative seated SLR bilaterally. Musculoskeletal:    A comprehensive musculoskeletal exam was performed for all joints of each upper and lower extremity and assessed for swelling, tenderness and range of motion. Results are documented as below:  Trace right 1st MCP swelling, interval resolved PIP3 synovitis and tenderness in right hand  Interval resolved synovitis of wrists. Right 5th MTP tenderness at base  No evidence of synovitis in the shoulders, elbows, hips, knees or ankles. Labs:  21: Cr 0.62, LFTs WNL, UA WNL, CRP 3mg/L, ESR 43; WBC 8.9, Hgb 12.0, Plt 140  21: CBC WNL (WBC 9.1), CMP WNL, ESR 29  21: CBC WNL; CMP WNL  21: TPMT WNL; MARIUM 1:320 homogenous; CRP 8mg/L; Hep B cAb neg, sAb neg; sAg neg; Hep C Ab neg, ESR 62  20: Cr 0.76, CBC WNL  3/11/20 CCP 35 (weak+), RF latex 13.7 [<14]; MARIUM direct negative; CRP <1mg/L, ESR 34;       Imagin21: XR hands and feet: No erosions, no chondrocalcinosis, preserved joint spacess      ASSESSMENT AND PLAN  Ms. Winsome Monroy is a 26 y.o. female who presents for evaluation of CCP+, MARIUM+ nonerosive rheumatoid arthritis now with low disease activity by CDAI on azathioprine monotherapy, but with breakthrough inflammatory arthralgias in the 1st MCP and base of the lateral 5th metatarsal. Adding meloxicam for additional pain relief, if active synovitis on next in-person visit could consider Cimzia or addition of sulfasalazine.     1. Seropositive rheumatoid arthritis ([facility]  - C REACTIVE PROTEIN, QT; Future  - CBC WITH AUTOMATED DIFF; Future  - METABOLIC PANEL, COMPREHENSIVE; Future  - SED RATE (ESR); Future  - meloxicam (MOBIC) 15 mg tablet; Take 1 Tablet by mouth daily. Take with food. Notify your ob/gyn with pregnancy. Dispense: 30 Tablet; Refill: 2    2. Ongoing use of possibly toxic medication  - CBC WITH AUTOMATED DIFF; Future  - METABOLIC PANEL, COMPREHENSIVE; Future    Pursuant to the emergency declaration under the [facility], [facility] waiver authority and the [employer] Act, this Virtual  Visit was conducted, with patient's consent, to reduce the patient's risk of exposure to COVID-19 and provide continuity of care for an established patient. Services were provided through a video synchronous discussion virtually to substitute for in-person clinic visit. Patient Instructions   1. Continue azathioprine 125mg (2.5 pills) daily    2. Start meloxicam once daily. To help most with morning stiffness, try taking it with dinner or at bedtime. 3. Repeat labs in 6 weeks    4. Return in 8 weeks, we'll call you to schedule. Orders Placed This Encounter    C REACTIVE PROTEIN, QT    CBC WITH AUTOMATED DIFF    METABOLIC PANEL, COMPREHENSIVE    SED RATE (ESR)    meloxicam (MOBIC) 15 mg tablet     Follow up: Return in about 8 weeks (around 9/13/2021).     Face to face time: 19 minutes  Note preparation and records review day of service: 25 minutes  Total provider time day of service: 40 minutes    Patricia Goss MD    Adult Rheumatology   [address] [place] [address], [address] [place] [address]   Phone [phone]  Fax [phone]

## 2021-08-10 DIAGNOSIS — Z79.899 ONGOING USE OF POSSIBLY TOXIC MEDICATION: ICD-10-CM

## 2021-08-10 DIAGNOSIS — R76.8 ANA POSITIVE: ICD-10-CM

## 2021-08-10 DIAGNOSIS — M05.9 SEROPOSITIVE RHEUMATOID ARTHRITIS (HCC): ICD-10-CM

## 2021-08-10 RX ORDER — AZATHIOPRINE 50 MG/1
TABLET ORAL
Qty: 75 TABLET | Refills: 3 | Status: SHIPPED | OUTPATIENT
Start: 2021-08-10 | End: 2021-11-02 | Stop reason: SDUPTHER

## 2021-08-20 ENCOUNTER — PATIENT MESSAGE (OUTPATIENT)
Dept: RHEUMATOLOGY | Age: 31
End: 2021-08-20

## 2021-09-14 LAB
ALBUMIN SERPL-MCNC: 4.7 G/DL (ref 3.8–4.8)
ALBUMIN/GLOB SERPL: 2 {RATIO} (ref 1.2–2.2)
ALP SERPL-CCNC: 82 IU/L (ref 44–121)
ALT SERPL-CCNC: 13 IU/L (ref 0–32)
AST SERPL-CCNC: 22 IU/L (ref 0–40)
BASOPHILS # BLD AUTO: 0.1 X10E3/UL (ref 0–0.2)
BASOPHILS NFR BLD AUTO: 1 %
BILIRUB SERPL-MCNC: <0.2 MG/DL (ref 0–1.2)
BUN SERPL-MCNC: 11 MG/DL (ref 6–20)
BUN/CREAT SERPL: 19 (ref 9–23)
CALCIUM SERPL-MCNC: 9.2 MG/DL (ref 8.7–10.2)
CHLORIDE SERPL-SCNC: 104 MMOL/L (ref 96–106)
CO2 SERPL-SCNC: 26 MMOL/L (ref 20–29)
CREAT SERPL-MCNC: 0.57 MG/DL (ref 0.57–1)
CRP SERPL-MCNC: 2 MG/L (ref 0–10)
EOSINOPHIL # BLD AUTO: 0.2 X10E3/UL (ref 0–0.4)
EOSINOPHIL NFR BLD AUTO: 2 %
ERYTHROCYTE [DISTWIDTH] IN BLOOD BY AUTOMATED COUNT: 12.9 % (ref 11.7–15.4)
ERYTHROCYTE [SEDIMENTATION RATE] IN BLOOD BY WESTERGREN METHOD: 30 MM/HR (ref 0–32)
GLOBULIN SER CALC-MCNC: 2.3 G/DL (ref 1.5–4.5)
GLUCOSE SERPL-MCNC: 113 MG/DL (ref 65–99)
HCT VFR BLD AUTO: 32.7 % (ref 34–46.6)
HGB BLD-MCNC: 10.4 G/DL (ref 11.1–15.9)
IMM GRANULOCYTES # BLD AUTO: 0 X10E3/UL (ref 0–0.1)
IMM GRANULOCYTES NFR BLD AUTO: 0 %
LYMPHOCYTES # BLD AUTO: 2.2 X10E3/UL (ref 0.7–3.1)
LYMPHOCYTES NFR BLD AUTO: 24 %
MCH RBC QN AUTO: 28.7 PG (ref 26.6–33)
MCHC RBC AUTO-ENTMCNC: 31.8 G/DL (ref 31.5–35.7)
MCV RBC AUTO: 90 FL (ref 79–97)
MONOCYTES # BLD AUTO: 0.6 X10E3/UL (ref 0.1–0.9)
MONOCYTES NFR BLD AUTO: 7 %
NEUTROPHILS # BLD AUTO: 6.1 X10E3/UL (ref 1.4–7)
NEUTROPHILS NFR BLD AUTO: 66 %
PLATELET # BLD AUTO: 140 X10E3/UL (ref 150–450)
POTASSIUM SERPL-SCNC: 4.6 MMOL/L (ref 3.5–5.2)
PROT SERPL-MCNC: 7 G/DL (ref 6–8.5)
RBC # BLD AUTO: 3.62 X10E6/UL (ref 3.77–5.28)
SODIUM SERPL-SCNC: 140 MMOL/L (ref 134–144)
WBC # BLD AUTO: 9.3 X10E3/UL (ref 3.4–10.8)

## 2021-09-24 ENCOUNTER — OFFICE VISIT (OUTPATIENT)
Dept: RHEUMATOLOGY | Age: 31
End: 2021-09-24
Payer: COMMERCIAL

## 2021-09-24 VITALS
RESPIRATION RATE: 18 BRPM | WEIGHT: 148 LBS | SYSTOLIC BLOOD PRESSURE: 114 MMHG | BODY MASS INDEX: 27.23 KG/M2 | HEART RATE: 80 BPM | DIASTOLIC BLOOD PRESSURE: 69 MMHG | HEIGHT: 62 IN | OXYGEN SATURATION: 100 % | TEMPERATURE: 98.4 F

## 2021-09-24 DIAGNOSIS — Z79.899 ONGOING USE OF POSSIBLY TOXIC MEDICATION: Primary | ICD-10-CM

## 2021-09-24 DIAGNOSIS — M05.9 SEROPOSITIVE RHEUMATOID ARTHRITIS (HCC): ICD-10-CM

## 2021-09-24 DIAGNOSIS — T39.395A NSAID INDUCED GASTRITIS: ICD-10-CM

## 2021-09-24 DIAGNOSIS — K29.60 NSAID INDUCED GASTRITIS: ICD-10-CM

## 2021-09-24 PROCEDURE — 99215 OFFICE O/P EST HI 40 MIN: CPT | Performed by: INTERNAL MEDICINE

## 2021-09-24 RX ORDER — FAMOTIDINE 20 MG/1
20 TABLET, FILM COATED ORAL 2 TIMES DAILY
Qty: 60 TABLET | Refills: 2 | Status: SHIPPED | OUTPATIENT
Start: 2021-09-24 | End: 2021-11-02 | Stop reason: SDUPTHER

## 2021-09-24 RX ORDER — MELOXICAM 7.5 MG/1
7.5 TABLET ORAL DAILY
Qty: 45 TABLET | Refills: 3 | Status: SHIPPED | OUTPATIENT
Start: 2021-09-24 | End: 2021-11-02 | Stop reason: SDUPTHER

## 2021-09-24 NOTE — PROGRESS NOTES
Chief Complaint   Patient presents with    Arthritis     1. Have you been to the ER, urgent care clinic since your last visit? Hospitalized since your last visit? No    2. Have you seen or consulted any other health care providers outside of the [address] [place] [address] since your last visit? Include any pap smears or colon screening.  No

## 2021-09-24 NOTE — PATIENT INSTRUCTIONS
1. There is very mild inflammation in a couple of knuckles, but if you are comfortable we can continue the current regimen. 2. Continue azathioprine 125mg daily and I'm prescribing lower-dose meloxicam 7.5mg daily--you can take a 2nd meloxicam during the day if needed for breakthrough pain. 3. Take Pepcid twice a day for the next 14 days, then try spacing out to an as-needed basis. 4. Repeat labs in 2 months. 5. Return in 3 months.

## 2021-09-24 NOTE — PROGRESS NOTES
REASON FOR VISIT:   Lynda Denton is a 33 y.o. female with history of CCP+, MARIUM+ nonerosive rheumatoid arthritis returning for in-office followup    HISTORY OF PRESENT ILLNESS  History obtained through  who provided translation    Rmains on 125mg azathioprine, meloxicam 15mg daily has seemed helpful. No interval infections, no diarrhea with azathioprine. Meloxicam since last visit has been helpful, but having more GERD and epigastric upset. Patient is happy with her current level of joint control, functional and mostly comfortable. Not interested in Cimzia currently. Still anticipating possibility of imminent pregnancy. REVIEW OF SYSTEMS  A comprehensive review of systems was negative except for that written in the HPI. A 10-point review of systems is per the new patient questionnaire, which has been reviewed extensively and scanned into the electronic medical record for future reference. Review of systems is as above and is otherwise negative. ALLERGIES  Patient has no known allergies. MEDICATIONS  Current Outpatient Medications   Medication Sig    azaTHIOprine (IMURAN) 50 mg tablet TAKE 2 & 1/2 TABS BY MOUTH DAILY.  meloxicam (MOBIC) 15 mg tablet Take 1 Tablet by mouth daily. Take with food. Notify your ob/gyn with pregnancy.  predniSONE (DELTASONE) 5 mg tablet TAKE 2TABS DAILY W/BREAKFAST FOR 2 WKS, THEN IF FEELING WELL DECREASE TO 1TAB DAILY UNTIL NEXT VISIT. (Patient not taking: Reported on 9/24/2021)     No current facility-administered medications for this visit. PAST MEDICAL HISTORY  History reviewed. No pertinent past medical history. FAMILY HISTORY  family history is not on file. Father had gout. Mother has asthma. SOCIAL HISTORY  She  reports that she has never smoked. She has never used smokeless tobacco. She reports that she does not drink alcohol and does not use drugs.   Social History     Social History Narrative    Not on file       DATA  Visit Vitals  BP 114/69 (BP 1 Location: Right arm, BP Patient Position: Sitting)   Pulse 80   Temp 98.4 °F (36.9 °C) (Oral)   Resp 18   Ht 5' 2\" (1.575 m)   Wt 148 lb (67.1 kg)   LMP 2021   SpO2 100%   BMI 27.07 kg/m²        General:  The patient is well developed, well nourished, alert, and in no apparent distress. Eyes: Sclera are anicteric. No conjunctival injection. HEENT:  Oropharynx is clear. No oral ulcers. Adequate salivary pooling. No cervical or supraclavicular lymphadenopathy. Lungs:  Clear to auscultation bilaterally, without wheeze or stridor. Normal respiratory effort. Cor:  Regular rate and rhythm. No murmur rub or gallop. Abdomen: Soft, non-tender, without hepatomegaly or masses. Extremities: No calf tenderness or edema. Warm and well perfused. Skin:  No significant abnormalities. No petechial, purpuric, or psoriaform rashes   Neuro: Nonfocal, no foot or wrist drop. Negative seated SLR bilaterally. Musculoskeletal:    A comprehensive musculoskeletal exam was performed for all joints of each upper and lower extremity and assessed for swelling, tenderness and range of motion. Results are documented as below:  Slightly prominent right > left ulnar styloids, no wrist subluxation. Interval return of trace PIP3 synovitis though without tenderness. Interval resolved synovitis of wrists and right 1st MCP. No evidence of synovitis in the shoulders, elbows, hips, knees or ankles. Labs:  21: WBC 9.3, Hgb 10.4, Plt 140; ESR 30; Cr 0.57, LFTs WNL, ESR 2  21: Cr 0.62, LFTs WNL, UA WNL, CRP 3mg/L, ESR 43; WBC 8.9, Hgb 12.0, Plt 140  21: CBC WNL (WBC 9.1), CMP WNL, ESR 29  21: CBC WNL; CMP WNL  21: TPMT WNL; MARIUM 1:320 homogenous; CRP 8mg/L; Hep B cAb neg, sAb neg; sAg neg;  Hep C Ab neg, ESR 62  20: Cr 0.76, CBC WNL  3/11/20 CCP 35 (weak+), RF latex 13.7 [<14]; MARIUM direct negative; CRP <1mg/L, ESR 34;       Imagin21: XR hands and feet: No erosions, no chondrocalcinosis, preserved joint spacess      ASSESSMENT AND PLAN  Ms. Mika Correia is a 33 y.o. female who presents for evaluation of CCP+, MARIUM+ nonerosive rheumatoid arthritis now with low disease activity by CDAI on azathioprine monotherapy with meloxicam for breakthrough pain. Latter is contributing to GI upset, providing lower-dose and reviewed Pepcid as needed     1. Seropositive rheumatoid arthritis (HCC)  - C REACTIVE PROTEIN, QT; Future  - CBC WITH AUTOMATED DIFF; Future  - METABOLIC PANEL, COMPREHENSIVE; Future  - SED RATE (ESR); Future  - meloxicam (MOBIC) 7.5 mg tablet; Take 1 Tablet by mouth daily. Take with food. OK to take a 2nd pill daily if needed for breakthrough pain. Notify your ob/gyn with pregnancy. Dispense: 45 Tablet; Refill: 3    2. Ongoing use of possibly toxic medication  - CBC WITH AUTOMATED DIFF; Future  - METABOLIC PANEL, COMPREHENSIVE; Future  - CBC WITH AUTOMATED DIFF  - METABOLIC PANEL, COMPREHENSIVE    3. NSAID induced gastritis  - famotidine (PEPCID) 20 mg tablet; Take 1 Tablet by mouth two (2) times a day. Dispense: 60 Tablet; Refill: 2      Patient Instructions   1. There is very mild inflammation in a couple of knuckles, but if you are comfortable we can continue the current regimen. 2. Continue azathioprine 125mg daily and I'm prescribing lower-dose meloxicam 7.5mg daily--you can take a 2nd meloxicam during the day if needed for breakthrough pain. 3. Take Pepcid twice a day for the next 14 days, then try spacing out to an as-needed basis. 4. Repeat labs in 2 months. 5. Return in 3 months. Orders Placed This Encounter    C REACTIVE PROTEIN, QT    CBC WITH AUTOMATED DIFF    METABOLIC PANEL, COMPREHENSIVE    SED RATE (ESR)    meloxicam (MOBIC) 7.5 mg tablet    famotidine (PEPCID) 20 mg tablet     Follow up: Return in about 3 months (around 12/24/2021).     Face to face time: 25 minutes  Note preparation and records review day of service: 20 minutes  Total provider time day of service: 45 minutes    Belen Owen MD    Adult Rheumatology   [address] [facility], [address]   Phone [phone]  Fax [phone]

## 2021-10-30 ENCOUNTER — PATIENT MESSAGE (OUTPATIENT)
Dept: RHEUMATOLOGY | Age: 31
End: 2021-10-30

## 2021-10-30 DIAGNOSIS — Z79.899 ONGOING USE OF POSSIBLY TOXIC MEDICATION: ICD-10-CM

## 2021-10-30 DIAGNOSIS — M05.9 SEROPOSITIVE RHEUMATOID ARTHRITIS (HCC): ICD-10-CM

## 2021-10-30 DIAGNOSIS — R76.8 ANA POSITIVE: ICD-10-CM

## 2021-11-01 NOTE — TELEPHONE ENCOUNTER
From: Adán Vergara  To: Darshan Bearden MD  Sent: 10/30/2021 8:04 PM EDT  Subject: Prescription Question    Dear Dr. Jessica Mauro,    We are traveling back Home on Nov 21 for 1 month. We just refill the prescription for one month. Could you please request the refill for next two months, so I will have enough medication for the travel. We will order the lab test soon and come for the visit once we come back. Please, let us know your suggestions. Thank you so much for your help and support.

## 2021-11-03 RX ORDER — AZATHIOPRINE 50 MG/1
125 TABLET ORAL DAILY
Qty: 225 TABLET | Refills: 0 | Status: SHIPPED | OUTPATIENT
Start: 2021-11-03 | End: 2021-12-29

## 2021-11-12 LAB
ALBUMIN SERPL-MCNC: 4.8 G/DL (ref 3.8–4.8)
ALBUMIN/GLOB SERPL: 1.9 {RATIO} (ref 1.2–2.2)
ALP SERPL-CCNC: 78 IU/L (ref 44–121)
ALT SERPL-CCNC: 21 IU/L (ref 0–32)
AST SERPL-CCNC: 23 IU/L (ref 0–40)
BASOPHILS # BLD AUTO: 0 X10E3/UL (ref 0–0.2)
BASOPHILS NFR BLD AUTO: 1 %
BILIRUB SERPL-MCNC: 0.3 MG/DL (ref 0–1.2)
BUN SERPL-MCNC: 13 MG/DL (ref 6–20)
BUN/CREAT SERPL: 18 (ref 9–23)
CALCIUM SERPL-MCNC: 9.7 MG/DL (ref 8.7–10.2)
CHLORIDE SERPL-SCNC: 105 MMOL/L (ref 96–106)
CO2 SERPL-SCNC: 25 MMOL/L (ref 20–29)
CREAT SERPL-MCNC: 0.71 MG/DL (ref 0.57–1)
CRP SERPL-MCNC: 2 MG/L (ref 0–10)
EOSINOPHIL # BLD AUTO: 0.1 X10E3/UL (ref 0–0.4)
EOSINOPHIL NFR BLD AUTO: 2 %
ERYTHROCYTE [DISTWIDTH] IN BLOOD BY AUTOMATED COUNT: 13.5 % (ref 11.7–15.4)
ERYTHROCYTE [SEDIMENTATION RATE] IN BLOOD BY WESTERGREN METHOD: 16 MM/HR (ref 0–32)
GLOBULIN SER CALC-MCNC: 2.5 G/DL (ref 1.5–4.5)
GLUCOSE SERPL-MCNC: 96 MG/DL (ref 65–99)
HCT VFR BLD AUTO: 37.6 % (ref 34–46.6)
HGB BLD-MCNC: 11.7 G/DL (ref 11.1–15.9)
IMM GRANULOCYTES # BLD AUTO: 0 X10E3/UL (ref 0–0.1)
IMM GRANULOCYTES NFR BLD AUTO: 0 %
LYMPHOCYTES # BLD AUTO: 2.2 X10E3/UL (ref 0.7–3.1)
LYMPHOCYTES NFR BLD AUTO: 29 %
MCH RBC QN AUTO: 28 PG (ref 26.6–33)
MCHC RBC AUTO-ENTMCNC: 31.1 G/DL (ref 31.5–35.7)
MCV RBC AUTO: 90 FL (ref 79–97)
MONOCYTES # BLD AUTO: 0.5 X10E3/UL (ref 0.1–0.9)
MONOCYTES NFR BLD AUTO: 6 %
NEUTROPHILS # BLD AUTO: 4.8 X10E3/UL (ref 1.4–7)
NEUTROPHILS NFR BLD AUTO: 62 %
PLATELET # BLD AUTO: 170 X10E3/UL (ref 150–450)
POTASSIUM SERPL-SCNC: 4.5 MMOL/L (ref 3.5–5.2)
PROT SERPL-MCNC: 7.3 G/DL (ref 6–8.5)
RBC # BLD AUTO: 4.18 X10E6/UL (ref 3.77–5.28)
SODIUM SERPL-SCNC: 141 MMOL/L (ref 134–144)
WBC # BLD AUTO: 7.6 X10E3/UL (ref 3.4–10.8)

## 2021-12-29 DIAGNOSIS — M05.9 SEROPOSITIVE RHEUMATOID ARTHRITIS (HCC): ICD-10-CM

## 2021-12-29 DIAGNOSIS — Z79.899 ONGOING USE OF POSSIBLY TOXIC MEDICATION: ICD-10-CM

## 2021-12-29 DIAGNOSIS — R76.8 ANA POSITIVE: ICD-10-CM

## 2021-12-29 RX ORDER — AZATHIOPRINE 50 MG/1
TABLET ORAL
Qty: 225 TABLET | Refills: 0 | Status: SHIPPED | OUTPATIENT
Start: 2021-12-29 | End: 2022-05-02 | Stop reason: SDUPTHER

## 2022-05-02 ENCOUNTER — OFFICE VISIT (OUTPATIENT)
Dept: RHEUMATOLOGY | Age: 32
End: 2022-05-02
Payer: COMMERCIAL

## 2022-05-02 VITALS
BODY MASS INDEX: 25.69 KG/M2 | HEIGHT: 62 IN | SYSTOLIC BLOOD PRESSURE: 110 MMHG | DIASTOLIC BLOOD PRESSURE: 72 MMHG | TEMPERATURE: 98.6 F | OXYGEN SATURATION: 99 % | HEART RATE: 88 BPM | WEIGHT: 139.6 LBS | RESPIRATION RATE: 16 BRPM

## 2022-05-02 DIAGNOSIS — R76.8 ANA POSITIVE: ICD-10-CM

## 2022-05-02 DIAGNOSIS — M22.2X2 PATELLOFEMORAL ARTHRALGIA OF BOTH KNEES: ICD-10-CM

## 2022-05-02 DIAGNOSIS — M22.2X1 PATELLOFEMORAL ARTHRALGIA OF BOTH KNEES: ICD-10-CM

## 2022-05-02 DIAGNOSIS — M05.9 SEROPOSITIVE RHEUMATOID ARTHRITIS (HCC): Primary | ICD-10-CM

## 2022-05-02 DIAGNOSIS — Z79.899 ONGOING USE OF POSSIBLY TOXIC MEDICATION: ICD-10-CM

## 2022-05-02 PROCEDURE — 99215 OFFICE O/P EST HI 40 MIN: CPT | Performed by: INTERNAL MEDICINE

## 2022-05-02 RX ORDER — AZATHIOPRINE 50 MG/1
125 TABLET ORAL DAILY
Qty: 225 TABLET | Refills: 0 | Status: SHIPPED | OUTPATIENT
Start: 2022-05-02 | End: 2022-06-06 | Stop reason: ALTCHOICE

## 2022-05-02 NOTE — PATIENT INSTRUCTIONS
1. Xrays, any [facility] location would be OK, such as those associated with the hospitals ([address] [place] [address], [address]). . you don't need an appointment. You can also check with [employer], [place]. [facility], [address]. Tel: [phone]    2. Restart azathioprine 125mg daily. 3. Repeat labs one month after restarting this. 4. Return in 2-3 months to evaluate your response.

## 2022-05-02 NOTE — PROGRESS NOTES
Chief Complaint   Patient presents with    Arthritis    Joint Pain     \"everywhere\"     1. Have you been to the ER, urgent care clinic since your last visit? Hospitalized since your last visit? No    2. Have you seen or consulted any other health care providers outside of the [address] [place] [address] since your last visit? Include any pap smears or colon screening.  No

## 2022-05-02 NOTE — PROGRESS NOTES
REASON FOR VISIT:   Rochelle Lanier is a 33 y.o. female with history of CCP+, MARIUM+ nonerosive rheumatoid arthritis returning for in-office followup    HISTORY OF PRESENT ILLNESS  History obtained through  who provided translation    Went home to [place] in November, was cold while they were there, while there held azathioprine, felt she did well for the first couple of weeks. Went to local [facility]/herbal specialist, for 3 months was getting soaks. There felt \"90 percent better,\" was able to wash clothes without limitation. Returned last month. Since stopping hand soaks and returning to the [place], pain is back. Bilateral 3rd PIPs are now more swollen. Anticipating trying for pregnancy now. REVIEW OF SYSTEMS  A comprehensive review of systems was negative except for that written in the HPI. A 10-point review of systems is per the new patient questionnaire, which has been reviewed extensively and scanned into the electronic medical record for future reference. Review of systems is as above and is otherwise negative. ALLERGIES  Patient has no known allergies. MEDICATIONS  Current Outpatient Medications   Medication Sig    famotidine (PEPCID) 20 mg tablet TAKE 1 TABLET BY MOUTH TWO TIMES A DAY. (Patient not taking: Reported on 5/2/2022)    azaTHIOprine (IMURAN) 50 mg tablet TAKE 2 AND 1/2 TABLETS BY MOUTH DAILY (Patient not taking: Reported on 5/2/2022)    meloxicam (MOBIC) 7.5 mg tablet Take 1 Tablet by mouth daily. Take with food. OK to take a 2nd pill daily if needed for breakthrough pain. Notify your ob/gyn with pregnancy. (Patient not taking: Reported on 5/2/2022)    predniSONE (DELTASONE) 5 mg tablet TAKE 2TABS DAILY W/BREAKFAST FOR 2 WKS, THEN IF FEELING WELL DECREASE TO 1TAB DAILY UNTIL NEXT VISIT. (Patient not taking: Reported on 9/24/2021)     No current facility-administered medications for this visit. PAST MEDICAL HISTORY  History reviewed. No pertinent past medical history. FAMILY HISTORY  family history is not on file. Father had gout. Mother has asthma. SOCIAL HISTORY  She  reports that she has never smoked. She has never used smokeless tobacco. She reports that she does not drink alcohol and does not use drugs. Social History     Social History Narrative    Not on file       DATA  Visit Vitals  /72 (BP 1 Location: Right arm, BP Patient Position: Sitting)   Pulse 88   Temp 98.6 °F (37 °C) (Oral)   Resp 16   Ht 5' 2\" (1.575 m)   Wt 139 lb 9.6 oz (63.3 kg)   SpO2 99%   BMI 25.53 kg/m²        General:  The patient is well developed, well nourished, alert, and in no apparent distress. Eyes: Sclera are anicteric. No conjunctival injection. HEENT:  Oropharynx is clear. No oral ulcers. Adequate salivary pooling. No cervical or supraclavicular lymphadenopathy. Lungs:  Clear to auscultation bilaterally, without wheeze or stridor. Normal respiratory effort. Cor:  Regular rate and rhythm. No murmur rub or gallop. Abdomen: Soft, non-tender, without hepatomegaly or masses. Extremities: No calf tenderness or edema. Warm and well perfused. Skin:  No significant abnormalities. No petechial, purpuric, or psoriaform rashes   Neuro: Nonfocal, no foot or wrist drop. Negative seated SLR bilaterally. Musculoskeletal:    A comprehensive musculoskeletal exam was performed for all joints of each upper and lower extremity and assessed for swelling, tenderness and range of motion. Results are documented as below:  Slightly prominent right > left ulnar styloids, no wrist subluxation. Interval return of moderate bilateral PIP3 synovitis with mild tenderness  Interval return of trace right wrist synovitis. No evidence of synovitis in the shoulders, elbows, hips, knees or ankles.      Labs:  11/11/21: CBC WNL; ESR 16; Cr 0.71, LFTs WNL, CRP 2mg/L  9/13/21: WBC 9.3, Hgb 10.4, Plt 140; ESR 30; Cr 0.57, LFTs WNL, ESR 2  6/25/21: Cr 0.62, LFTs WNL, UA WNL, CRP 3mg/L, ESR 43; WBC 8.9, Hgb 12.0, Plt 140  21: CBC WNL (WBC 9.1), CMP WNL, ESR 29  21: CBC WNL; CMP WNL  21: TPMT WNL; MARIUM 1:320 homogenous; CRP 8mg/L; Hep B cAb neg, sAb neg; sAg neg; Hep C Ab neg, ESR 62  20: Cr 0.76, CBC WNL  3/11/20 CCP 35 (weak+), RF latex 13.7 [<14]; MARIUM direct negative; CRP <1mg/L, ESR 34;       Imagin21: XR hands and feet: No erosions, no chondrocalcinosis, preserved joint spacess      ASSESSMENT AND PLAN  Ms. Josafat Aranda is a 33 y.o. female who presents for evaluation of CCP+, MARIUM+ nonerosive rheumatoid arthritis with flaring arthritis after stopping azathioprine. Restarting azathioprine at previously effective 125mg daily dose given plans to pursue pregnancy. 1. Seropositive rheumatoid arthritis (HCC)  - XR HAND RT MIN 3 V; Future  - XR HAND LT MIN 3 V; Future  - XR FOOT RT MIN 3 V; Future  - XR FOOT LT MIN 3 V; Future  - XR HAND RT MIN 3 V; Future  - XR HAND LT MIN 3 V; Future  - azaTHIOprine (IMURAN) 50 mg tablet; Take 2.5 Tablets by mouth daily. Dispense: 225 Tablet; Refill: 0  - XR SHOULDER RT AP/LAT MIN 2 V; Future  - XR SHOULDER LT AP/LAT MIN 2 V; Future  - XR KNEE RT 3 V; Future  - XR KNEE LT 3 V; Future  - C REACTIVE PROTEIN, QT; Future  - CBC WITH AUTOMATED DIFF; Future  - METABOLIC PANEL, COMPREHENSIVE; Future  - SED RATE (ESR); Future    2. Ongoing use of possibly toxic medication  - azaTHIOprine (IMURAN) 50 mg tablet; Take 2.5 Tablets by mouth daily. Dispense: 225 Tablet; Refill: 0  - CBC WITH AUTOMATED DIFF; Future  - METABOLIC PANEL, COMPREHENSIVE; Future    3. MARIUM positive  - azaTHIOprine (IMURAN) 50 mg tablet; Take 2.5 Tablets by mouth daily. Dispense: 225 Tablet; Refill: 0    4. Patellofemoral arthralgia of both knees  - XR KNEE RT 3 V; Future  - XR KNEE LT 3 V; Future     Patient Instructions   1. Xrays, any [facility] location would be OK, such as those associated with the [facility] ([address] [place] [address], [address]). Miles Edwards you don't need an appointment. You can also check with [facility] [facility] [employer], [place]. [place], [address]. Tel: [phone]    2. Restart azathioprine 125mg daily. 3. Repeat labs one month after restarting this. 4. Return in 2-3 months to evaluate your response. Orders Placed This Encounter    XR HAND RT MIN 3 V    XR HAND LT MIN 3 V    XR FOOT RT MIN 3 V    XR FOOT LT MIN 3 V    XR HAND RT MIN 3 V    XR HAND LT MIN 3 V    XR SHOULDER RT AP/LAT MIN 2 V    XR SHOULDER LT AP/LAT MIN 2 V    XR KNEE RT 3 V    XR KNEE LT 3 V    C REACTIVE PROTEIN, QT    CBC WITH AUTOMATED DIFF    METABOLIC PANEL, COMPREHENSIVE    SED RATE (ESR)    azaTHIOprine (IMURAN) 50 mg tablet     Follow up: Return in about 3 months (around 8/2/2022).     Face to face time: 25 minutes  Note preparation and records review day of service: 20 minutes  Total provider time day of service: 45 minutes    Angelo Nagel MD    Adult Rheumatology   [address], [address] [place] [address]   Phone [phone]  Fax [phone]

## 2022-05-09 ENCOUNTER — HOSPITAL ENCOUNTER (OUTPATIENT)
Dept: GENERAL RADIOLOGY | Age: 32
Discharge: HOME | End: 2022-05-09
Attending: INTERNAL MEDICINE
Payer: COMMERCIAL

## 2022-05-09 DIAGNOSIS — M05.9 SEROPOSITIVE RHEUMATOID ARTHRITIS (HCC): ICD-10-CM

## 2022-05-09 DIAGNOSIS — M22.2X2 PATELLOFEMORAL ARTHRALGIA OF BOTH KNEES: ICD-10-CM

## 2022-05-09 DIAGNOSIS — M22.2X1 PATELLOFEMORAL ARTHRALGIA OF BOTH KNEES: ICD-10-CM

## 2022-05-09 PROCEDURE — 73130 X-RAY EXAM OF HAND: CPT

## 2022-05-09 PROCEDURE — 73030 X-RAY EXAM OF SHOULDER: CPT

## 2022-05-09 PROCEDURE — 73630 X-RAY EXAM OF FOOT: CPT

## 2022-05-09 PROCEDURE — 73562 X-RAY EXAM OF KNEE 3: CPT

## 2022-05-26 ENCOUNTER — PATIENT MESSAGE (OUTPATIENT)
Dept: RHEUMATOLOGY | Age: 32
End: 2022-05-26

## 2022-05-26 DIAGNOSIS — M05.9 SEROPOSITIVE RHEUMATOID ARTHRITIS (HCC): Primary | ICD-10-CM

## 2022-06-03 DIAGNOSIS — M05.9 SEROPOSITIVE RHEUMATOID ARTHRITIS (HCC): Primary | ICD-10-CM

## 2022-06-03 NOTE — TELEPHONE ENCOUNTER
Batsheva Lam, RN 6/3/2022 8:59 AM EDT    Do you want to place orders for Cimzia?  ----- Message -----  From: Royce Davis  Sent: 6/2/2022 6:44 PM EDT  To: [facility] Nurse Pool  Subject: Test results     Dear Dr. Leilani Fitzgerald,    I am waiting for your suggestions regarding Cimzia. Please let us know at your earliest convenience. Anastasiya Erickson.

## 2022-06-06 DIAGNOSIS — M05.9 SEROPOSITIVE RHEUMATOID ARTHRITIS (HCC): Primary | ICD-10-CM

## 2022-06-06 RX ORDER — CERTOLIZUMAB PEGOL 200 MG/ML
INJECTION, SOLUTION SUBCUTANEOUS
Qty: 3 EACH | Refills: 0 | Status: SHIPPED | OUTPATIENT
Start: 2022-06-06

## 2022-06-07 ENCOUNTER — DOCUMENTATION ONLY (OUTPATIENT)
Dept: PHARMACY | Age: 32
End: 2022-06-07

## 2022-06-07 NOTE — PROGRESS NOTES
[facility] at [address] [facility] Update    Date: 06/07/22    Laz Erm 1990     Medication: Cimzia Starter Kit    Prior Authorization: approved through 6/3/23    Co-pay $0    Fill: 06/03/22    Ship: 06/06/22    Deliver: 06/07/22    Next Fill Due: 06/28/22    Pharmacist offered counseling to the patient. Handout provided.     [address] at [facility], [address] [place] [address], [address]  phone: [phone]   fax: [phone]

## 2022-06-28 ENCOUNTER — DOCUMENTATION ONLY (OUTPATIENT)
Dept: PHARMACY | Age: 32
End: 2022-06-28

## 2022-06-28 NOTE — PROGRESS NOTES
[facility] at [address] [facility] [place] [facility] Update    Date: 06/28/22    Myles Cao 1990    Medication: Cimzia 200 mg/ml kit    Prior Authorization: through 6/3/2023    Prescription needs to be transferred to [address] (phone: [phone]). Sean Castillo ([phone]) was notified that this specialty prescription needs to be transferred to the insurance mandated specialty pharmacy above.      Ruben Hughes, [address] at [facility],  [address]   [place], [address] [place] [address] Avenue  phone: [phone]   fax: [phone]

## 2022-06-29 DIAGNOSIS — M05.9 SEROPOSITIVE RHEUMATOID ARTHRITIS (HCC): ICD-10-CM

## 2022-07-29 ENCOUNTER — TELEPHONE (OUTPATIENT)
Dept: RHEUMATOLOGY | Age: 32
End: 2022-07-29

## 2022-07-29 NOTE — TELEPHONE ENCOUNTER
Nayeli from [facility] called stating they will need to clarify if the pt had a TB test done for Cimizia. Please advise.     [phone]

## 2022-08-01 NOTE — TELEPHONE ENCOUNTER
Valentin Parnell, pharmacist at [facility] informed pt had TB test 2/21 and at low risk, no signs of exposure and ok to give cimzia per Dr. Estuardo Britoain

## 2022-08-24 ENCOUNTER — TELEPHONE (OUTPATIENT)
Dept: RHEUMATOLOGY | Age: 32
End: 2022-08-24

## 2022-08-24 NOTE — TELEPHONE ENCOUNTER
Called patient spoke with pt spouse which I advise the next available appt the physician has is Sept 26, pt spouse stated leave appt that's on the books for tomorrow 8/25/2022 and he would call back if needed.  Sdh

## 2022-08-25 ENCOUNTER — OFFICE VISIT (OUTPATIENT)
Dept: RHEUMATOLOGY | Age: 32
End: 2022-08-25
Payer: COMMERCIAL

## 2022-08-25 VITALS
HEART RATE: 75 BPM | HEIGHT: 62 IN | SYSTOLIC BLOOD PRESSURE: 110 MMHG | RESPIRATION RATE: 18 BRPM | OXYGEN SATURATION: 98 % | WEIGHT: 136 LBS | DIASTOLIC BLOOD PRESSURE: 75 MMHG | BODY MASS INDEX: 25.03 KG/M2 | TEMPERATURE: 98.7 F

## 2022-08-25 DIAGNOSIS — M05.9 SEROPOSITIVE RHEUMATOID ARTHRITIS (HCC): Primary | ICD-10-CM

## 2022-08-25 DIAGNOSIS — Z79.899 ONGOING USE OF POSSIBLY TOXIC MEDICATION: ICD-10-CM

## 2022-08-25 DIAGNOSIS — O09.291 PRIOR MISCARRIAGE WITH PREGNANCY, ANTEPARTUM, FIRST TRIMESTER: ICD-10-CM

## 2022-08-25 DIAGNOSIS — R76.8 ANA POSITIVE: ICD-10-CM

## 2022-08-25 PROCEDURE — 99214 OFFICE O/P EST MOD 30 MIN: CPT | Performed by: INTERNAL MEDICINE

## 2022-08-25 NOTE — PROGRESS NOTES
REASON FOR VISIT:   Rachel Beasley is a 33 y.o. female with history of CCP+, MARIUM+ nonerosive rheumatoid arthritis returning for in-office followup    HISTORY OF PRESENT ILLNESS    Pt returns for a follow up. She is here with her  who translates for her. Pt has been back every other week Cimzia for 2 injections. She says that the first injection gave her acid reflux but this did not happen after the second injection so she thinks that it was a coincidence. Pt reports that her L hand is painful and swollen today, specifically in her middle fingers. She denies any joint pain outside of her hand. She notes that her hand swelling is better today than it was when her flare first started. Pt denies rashes, mouth ulcers    Pt report that she was okay until she came back from [place].     Pt is moving soon so this is likely their last visit here. REVIEW OF SYSTEMS  A comprehensive review of systems was negative except for that written in the HPI. A 10-point review of systems is per the new patient questionnaire, which has been reviewed extensively and scanned into the electronic medical record for future reference. Review of systems is as above and is otherwise negative. ALLERGIES  Patient has no known allergies. MEDICATIONS  Current Outpatient Medications   Medication Sig    certolizumab pegoL (CIMZIA) 400 mg/2 mL (200 mg/mL x 2) sykt injection 2 mL by SubCUTAneous route every fourteen (14) days. Loading dose. After 3 doses of 400mg, continue maintenance dose of 200mg every 14 days (ordered separately)    certolizumab pegoL (CIMZIA) 400 mg/2 mL (200 mg/mL x 2) sykt injection 1 mL by SubCUTAneous route every fourteen (14) days. Maintenance dose, to start after 3 400mg loading doses ordered separately.     Cimzia Starter Kit 400 mg/2 mL (200 mg/mL x 2) sykt injection LOADING DOSE: INJECT 2 SYRINGES (400MG) UNDER THE SKIN EVERY 14 DAYS FOR 3 DOSES    famotidine (PEPCID) 20 mg tablet TAKE 1 TABLET BY MOUTH TWO TIMES A DAY. (Patient not taking: Reported on 5/2/2022)    meloxicam (MOBIC) 7.5 mg tablet Take 1 Tablet by mouth daily. Take with food. OK to take a 2nd pill daily if needed for breakthrough pain. Notify your ob/gyn with pregnancy. (Patient not taking: Reported on 5/2/2022)    predniSONE (DELTASONE) 5 mg tablet TAKE 2TABS DAILY W/BREAKFAST FOR 2 WKS, THEN IF FEELING WELL DECREASE TO 1TAB DAILY UNTIL NEXT VISIT. (Patient not taking: Reported on 9/24/2021)     No current facility-administered medications for this visit. PAST MEDICAL HISTORY  No past medical history on file. FAMILY HISTORY  family history is not on file. Father had gout. Mother has asthma. SOCIAL HISTORY  She  reports that she has never smoked. She has never used smokeless tobacco. She reports that she does not drink alcohol and does not use drugs. Social History     Social History Narrative    Not on file       DATA  Visit Vitals  /75 (BP 1 Location: Right arm, BP Patient Position: Sitting)   Pulse 75   Temp 98.7 °F (37.1 °C) (Oral)   Resp 18   Ht 5' 2\" (1.575 m)   Wt 136 lb (61.7 kg)   SpO2 98%   BMI 24.87 kg/m²        General:  The patient is well developed, well nourished, alert, and in no apparent distress. Eyes: Sclera are anicteric. No conjunctival injection. HEENT:  Oropharynx is clear. No oral ulcers. Adequate salivary pooling. No cervical or supraclavicular lymphadenopathy. Lungs:  Clear to auscultation bilaterally, without wheeze or stridor. Normal respiratory effort. Cor:  Regular rate and rhythm. No murmur rub or gallop. Abdomen: Soft, non-tender, without hepatomegaly or masses. Extremities: No calf tenderness or edema. Warm and well perfused. Skin:  No significant abnormalities. No petechial, purpuric, or psoriaform rashes   Neuro: Nonfocal, no foot or wrist drop. Negative seated SLR bilaterally.   Musculoskeletal:    A comprehensive musculoskeletal exam was performed for all joints of each upper and lower extremity and assessed for swelling, tenderness and range of motion. Results are documented as below:  Slightly prominent right > left ulnar styloids, no wrist subluxation. Mild residual bilateral PIP3 synovitis with mild tenderness  Interval resolution of prior trace right wrist synovitis. No evidence of synovitis in the shoulders, elbows, hips, knees or ankles. Labs:  22: WBC 8.4, HGB 12.7, Plt 134, Rubella IgG Ab pos, Hep B surface Ag neg, Hep C virus Ab neg,   21: CBC WNL; ESR 16; Cr 0.71, LFTs WNL, CRP 2mg/L  21: WBC 9.3, Hgb 10.4, Plt 140; ESR 30; Cr 0.57, LFTs WNL, ESR 2  21: Cr 0.62, LFTs WNL, UA WNL, CRP 3mg/L, ESR 43; WBC 8.9, Hgb 12.0, Plt 140  21: CBC WNL (WBC 9.1), CMP WNL, ESR 29  21: CBC WNL; CMP WNL  21: TPMT WNL; MARIUM 1:320 homogenous; CRP 8mg/L; Hep B cAb neg, sAb neg; sAg neg; Hep C Ab neg, ESR 62  20: Cr 0.76, CBC WNL  3/11/20 CCP 35 (weak+), RF latex 13.7 [<14]; MARIUM direct negative; CRP <1mg/L, ESR 34;       Imagin22 XR knees: normal  22 XR shoulders: normal  22 XR feet: normal  22 XR Hands:  1. Right hand focal erosions at the head of the first metacarpal and the head of the third digit proximal phalanx consistent with rheumatoid arthritis. Soft tissue swelling around the third digit PIP joint suggestive of synovitis. 2. Left hand focal erosions at the first metacarpal head and the head of the third digit proximal phalanx consistent with rheumatoid arthritis. There is also mild soft tissue swelling around the third digit PIP joint. 21: XR hands and feet: No erosions, no chondrocalcinosis, preserved joint spacess      ASSESSMENT AND PLAN  Ms. Silver Hudson is a 33 y.o. female who presents for evaluation of CCP+, MARIUM+ nonerosive rheumatoid arthritis with recent flare but already experiencing improvement after two injections of Cimzia.  Given radiographic progression while on azathioprine, plan to continue Cimzia for medium term. 1. Prior miscarriage with pregnancy, antepartum, first trimester  - ANTIPHOSPHOLIPID SYNDROME PANEL; Future    2. Seropositive rheumatoid arthritis (HCC)  - SED RATE (ESR); Future  - C REACTIVE PROTEIN, QT; Future  - COMPLEMENT, C3 & C4; Future  - METABOLIC PANEL, COMPREHENSIVE; Future  - CBC WITH AUTOMATED DIFF; Future  - URINALYSIS W/ REFLEX CULTURE; Future  - QUANTIFERON-TB GOLD PLUS; Future  - ANTIPHOSPHOLIPID SYNDROME PANEL; Future  - C REACTIVE PROTEIN, QT; Future  - CBC WITH AUTOMATED DIFF; Future  - METABOLIC PANEL, COMPREHENSIVE; Future  - SED RATE (ESR); Future    3. MARIUM positive  - COMPLEMENT, C3 & C4; Future  - URINALYSIS W/ REFLEX CULTURE; Future  - ANTIPHOSPHOLIPID SYNDROME PANEL; Future    4. Ongoing use of possibly toxic medication  - SED RATE (ESR); Future  - C REACTIVE PROTEIN, QT; Future  - COMPLEMENT, C3 & C4; Future  - METABOLIC PANEL, COMPREHENSIVE; Future  - CBC WITH AUTOMATED DIFF; Future  - URINALYSIS W/ REFLEX CULTURE; Future  - QUANTIFERON-TB GOLD PLUS; Future  - ANTIPHOSPHOLIPID SYNDROME PANEL; Future  - METABOLIC PANEL, COMPREHENSIVE; Future    Patient Instructions   1) Continue taking Cimzia injections once every 2 weeks. 2) Get labs done in the next month and then again in 3 months. 3) Follow up in 6 months. If you are no longer in the area then we can cancel this appointment. Orders Placed This Encounter    QUANTIFERON-TB GOLD PLUS    SED RATE (ESR)    C REACTIVE PROTEIN, QT    COMPLEMENT, C3 & C4    METABOLIC PANEL, COMPREHENSIVE    CBC WITH AUTOMATED DIFF    URINALYSIS W/ REFLEX CULTURE    ANTIPHOSPHOLIPID SYNDROME PANEL    C REACTIVE PROTEIN, QT    CBC WITH AUTOMATED DIFF    METABOLIC PANEL, COMPREHENSIVE    SED RATE (ESR)       Follow up: Return in about 6 months (around 2/25/2023).     Face to face time: 15 minutes  Note preparation and records review day of service: 21 minutes  Total provider time day of service: 36 minutes    This was scribed by Bart Luis in the presence of Dr. Arlene Babinski, MD    Adult Rheumatology   [address]  [facility], [address]   Phone [phone]  Fax [phone]

## 2022-08-25 NOTE — PROGRESS NOTES
Chief Complaint   Patient presents with    Arthritis     1. Have you been to the ER, urgent care clinic since your last visit? Hospitalized since your last visit? No    2. Have you seen or consulted any other health care providers outside of the [address] [place] [address] since your last visit? Include any pap smears or colon screening.  No

## 2022-08-25 NOTE — PATIENT INSTRUCTIONS
1) Continue taking Cimzia injections once every 2 weeks. 2) Get labs done in the next month and then again in 3 months. 3) Follow up in 6 months. If you are no longer in the area then we can cancel this appointment.

## 2022-10-10 LAB
ALBUMIN SERPL-MCNC: 4.9 G/DL (ref 3.8–4.8)
ALBUMIN/GLOB SERPL: 1.8 {RATIO} (ref 1.2–2.2)
ALP SERPL-CCNC: 89 IU/L (ref 44–121)
ALT SERPL-CCNC: 17 IU/L (ref 0–32)
APPEARANCE UR: CLEAR
APTT HEX PL PPP: 12 SEC
APTT IMM NP PPP: ABNORMAL SEC
APTT PPP 1:1 SALINE: ABNORMAL SEC
APTT PPP: 28.6 SEC
AST SERPL-CCNC: 22 IU/L (ref 0–40)
B2 GLYCOPROT1 IGA SER-ACNC: 15 SAU
B2 GLYCOPROT1 IGG SER-ACNC: <10 SGU
B2 GLYCOPROT1 IGM SER-ACNC: <10 SMU
BACTERIA #/AREA URNS HPF: NORMAL /[HPF]
BASOPHILS # BLD AUTO: 0 X10E3/UL (ref 0–0.2)
BASOPHILS NFR BLD AUTO: 0 %
BILIRUB SERPL-MCNC: 0.4 MG/DL (ref 0–1.2)
BILIRUB UR QL STRIP: NEGATIVE
BUN SERPL-MCNC: 8 MG/DL (ref 6–20)
BUN/CREAT SERPL: 11 (ref 9–23)
C3 SERPL-MCNC: 132 MG/DL (ref 82–167)
C4 SERPL-MCNC: 39 MG/DL (ref 12–38)
CALCIUM SERPL-MCNC: 9.6 MG/DL (ref 8.7–10.2)
CARDIOLIPIN IGA SER IA-ACNC: <10 APL
CARDIOLIPIN IGG SER IA-ACNC: <10 GPL
CARDIOLIPIN IGM SER IA-ACNC: <10 MPL
CASTS URNS QL MICRO: NORMAL /LPF
CHLORIDE SERPL-SCNC: 103 MMOL/L (ref 96–106)
CO2 SERPL-SCNC: 24 MMOL/L (ref 20–29)
COLOR UR: YELLOW
CONFIRM DRVVT: ABNORMAL SEC
CREAT SERPL-MCNC: 0.72 MG/DL (ref 0.57–1)
CRP SERPL-MCNC: <1 MG/L (ref 0–10)
DRVVT SCREEN TO CONFIRM RATIO: ABNORMAL RATIO
EGFR: 112 ML/MIN/1.73
EOSINOPHIL # BLD AUTO: 0.3 X10E3/UL (ref 0–0.4)
EOSINOPHIL NFR BLD AUTO: 4 %
EPI CELLS #/AREA URNS HPF: NORMAL /HPF (ref 0–10)
ERYTHROCYTE [DISTWIDTH] IN BLOOD BY AUTOMATED COUNT: 12.9 % (ref 11.7–15.4)
ERYTHROCYTE [SEDIMENTATION RATE] IN BLOOD BY WESTERGREN METHOD: 18 MM/HR (ref 0–32)
GAMMA INTERFERON BACKGROUND BLD IA-ACNC: 0.04 IU/ML
GLOBULIN SER CALC-MCNC: 2.7 G/DL (ref 1.5–4.5)
GLUCOSE SERPL-MCNC: 107 MG/DL (ref 70–99)
GLUCOSE UR QL STRIP: NEGATIVE
HCT VFR BLD AUTO: 40.5 % (ref 34–46.6)
HGB BLD-MCNC: 13 G/DL (ref 11.1–15.9)
HGB UR QL STRIP: NEGATIVE
IMM GRANULOCYTES # BLD AUTO: 0 X10E3/UL (ref 0–0.1)
IMM GRANULOCYTES NFR BLD AUTO: 0 %
KETONES UR QL STRIP: NEGATIVE
LAC INTERPRETATION, 502038: ABNORMAL
LEUKOCYTE ESTERASE UR QL STRIP: NEGATIVE
LYMPHOCYTES # BLD AUTO: 2.6 X10E3/UL (ref 0.7–3.1)
LYMPHOCYTES NFR BLD AUTO: 34 %
M TB IFN-G BLD-IMP: NEGATIVE
M TB IFN-G CD4+ BCKGRND COR BLD-ACNC: 0.05 IU/ML
MCH RBC QN AUTO: 28.9 PG (ref 26.6–33)
MCHC RBC AUTO-ENTMCNC: 32.1 G/DL (ref 31.5–35.7)
MCV RBC AUTO: 90 FL (ref 79–97)
MICRO URNS: ABNORMAL
MICRO URNS: ABNORMAL
MITOGEN IGNF BLD-ACNC: >10 IU/ML
MONOCYTES # BLD AUTO: 0.5 X10E3/UL (ref 0.1–0.9)
MONOCYTES NFR BLD AUTO: 7 %
MORPHOLOGY BLD-IMP: ABNORMAL
NEUTROPHILS # BLD AUTO: 4.3 X10E3/UL (ref 1.4–7)
NEUTROPHILS NFR BLD AUTO: 55 %
NITRITE UR QL STRIP: NEGATIVE
PH UR STRIP: 7 [PH] (ref 5–7.5)
PLATELET # BLD AUTO: 105 X10E3/UL (ref 150–450)
PLATELET NEUTRALIZATION 500049: 0 SEC
POTASSIUM SERPL-SCNC: 4 MMOL/L (ref 3.5–5.2)
PROT SERPL-MCNC: 7.6 G/DL (ref 6–8.5)
PROT UR QL STRIP: NEGATIVE
PROTHROM IGG SERPL-ACNC: 5 G UNITS
PS IGG SER IA-ACNC: 3 GPS
PS IGM SER IA-ACNC: 1 MPS
QUANTIFERON INCUBATION, QF1T: NORMAL
QUANTIFERON TB2 AG: 0.06 IU/ML
RBC # BLD AUTO: 4.5 X10E6/UL (ref 3.77–5.28)
RBC #/AREA URNS HPF: NORMAL /HPF (ref 0–2)
SCREEN DRVVT: 29.9 SEC
SERVICE CMNT-IMP: NORMAL
SODIUM SERPL-SCNC: 142 MMOL/L (ref 134–144)
SP GR UR STRIP: <=1.005 (ref 1–1.03)
URINALYSIS REFLEX, 377202: ABNORMAL
UROBILINOGEN UR STRIP-MCNC: 0.2 MG/DL (ref 0.2–1)
WBC # BLD AUTO: 7.8 X10E3/UL (ref 3.4–10.8)
WBC #/AREA URNS HPF: NORMAL /HPF (ref 0–5)